# Patient Record
Sex: FEMALE | Race: WHITE | NOT HISPANIC OR LATINO | Employment: FULL TIME | URBAN - METROPOLITAN AREA
[De-identification: names, ages, dates, MRNs, and addresses within clinical notes are randomized per-mention and may not be internally consistent; named-entity substitution may affect disease eponyms.]

---

## 2017-12-06 ENCOUNTER — GENERIC CONVERSION - ENCOUNTER (OUTPATIENT)
Dept: OTHER | Facility: OTHER | Age: 37
End: 2017-12-06

## 2017-12-06 ENCOUNTER — ALLSCRIPTS OFFICE VISIT (OUTPATIENT)
Dept: OTHER | Facility: OTHER | Age: 37
End: 2017-12-06

## 2017-12-07 LAB
ABO GROUP BLD: NORMAL
BLD GP AB SCN SERPL QL: NEGATIVE
HCG, QUANTITATIVE (HISTORICAL): NORMAL MIU/ML
PROGESTERONE LEVEL (HISTORICAL): 24.8 NG/ML
RH BLD: POSITIVE

## 2017-12-08 ENCOUNTER — GENERIC CONVERSION - ENCOUNTER (OUTPATIENT)
Dept: OTHER | Facility: OTHER | Age: 37
End: 2017-12-08

## 2017-12-08 DIAGNOSIS — O36.80X0 PREGNANCY WITH INCONCLUSIVE FETAL VIABILITY: ICD-10-CM

## 2017-12-18 ENCOUNTER — HOSPITAL ENCOUNTER (OUTPATIENT)
Dept: ULTRASOUND IMAGING | Facility: MEDICAL CENTER | Age: 37
Discharge: HOME | End: 2017-12-18
Payer: COMMERCIAL

## 2017-12-18 DIAGNOSIS — O36.80X0 PREGNANCY WITH INCONCLUSIVE FETAL VIABILITY: ICD-10-CM

## 2017-12-18 PROCEDURE — 76801 OB US < 14 WKS SINGLE FETUS: CPT

## 2017-12-20 ENCOUNTER — GENERIC CONVERSION - ENCOUNTER (OUTPATIENT)
Dept: OTHER | Facility: OTHER | Age: 37
End: 2017-12-20

## 2018-01-05 ENCOUNTER — ALLSCRIPTS OFFICE VISIT (OUTPATIENT)
Dept: OTHER | Facility: OTHER | Age: 38
End: 2018-01-05

## 2018-01-05 ENCOUNTER — GENERIC CONVERSION - ENCOUNTER (OUTPATIENT)
Dept: OTHER | Facility: OTHER | Age: 38
End: 2018-01-05

## 2018-01-06 LAB
ABO GROUP BLD: ABNORMAL
BASOPHILS # BLD AUTO: 0 X10E3/UL (ref 0–0.2)
BASOPHILS # BLD AUTO: 1 %
BILIRUB UR QL STRIP: NEGATIVE
BLD GP AB SCN SERPL QL: NEGATIVE
COLOR UR: YELLOW
COMMENT (HISTORICAL): ABNORMAL
DEPRECATED RDW RBC AUTO: 13.1 % (ref 12.3–15.4)
EOSINOPHIL # BLD AUTO: 0.1 X10E3/UL (ref 0–0.4)
EOSINOPHIL # BLD AUTO: 2 %
FECAL OCCULT BLOOD DIAGNOSTIC (HISTORICAL): NEGATIVE
GLUCOSE (HISTORICAL): NEGATIVE
HCT VFR BLD AUTO: 33.5 % (ref 34–46.6)
HEPATITIS B SURFACE ANTIGEN (HISTORICAL): NEGATIVE
HGB BLD-MCNC: 11.8 G/DL (ref 11.1–15.9)
IMM.GRANULOCYTES (CD4/8) (HISTORICAL): 0.1 X10E3/UL (ref 0–0.1)
IMM.GRANULOCYTES (CD4/8) (HISTORICAL): 1 %
KETONES UR STRIP-MCNC: NEGATIVE MG/DL
LEUKOCYTE ESTERASE UR QL STRIP: NEGATIVE
LYMPHOCYTES # BLD AUTO: 1.5 X10E3/UL (ref 0.7–3.1)
LYMPHOCYTES # BLD AUTO: 26 %
MCH RBC QN AUTO: 31.1 PG (ref 26.6–33)
MCHC RBC AUTO-ENTMCNC: 35.2 G/DL (ref 31.5–35.7)
MCV RBC AUTO: 88 FL (ref 79–97)
MICROSCOPIC EXAMINATION (HISTORICAL): ABNORMAL
MONOCYTES # BLD AUTO: 0.8 X10E3/UL (ref 0.1–0.9)
MONOCYTES (HISTORICAL): 14 %
NEUTROPHILS # BLD AUTO: 3.2 X10E3/UL (ref 1.4–7)
NEUTROPHILS # BLD AUTO: 56 %
NITRITE UR QL STRIP: NEGATIVE
PH UR STRIP.AUTO: 5.5 [PH] (ref 5–7.5)
PLATELET # BLD AUTO: 273 X10E3/UL (ref 150–379)
PROT UR STRIP-MCNC: NEGATIVE MG/DL
RBC (HISTORICAL): 3.79 X10E6/UL (ref 3.77–5.28)
RH BLD: POSITIVE
RPR SCREEN (HISTORICAL): NON REACTIVE
RUBELLA, IGG (HISTORICAL): 3.16 INDEX
SP GR UR STRIP.AUTO: >=1.03 (ref 1–1.03)
UROBILINOGEN UR QL STRIP.AUTO: 0.2 EU/DL (ref 0.2–1)
WBC # BLD AUTO: 5.7 X10E3/UL (ref 3.4–10.8)

## 2018-01-07 LAB — CULTURE RESULT (HISTORICAL): ABNORMAL

## 2018-01-23 NOTE — RESULT NOTES
Verified Results  (1) HCG QUANT [id number] 04:14AM [employer]     Test Name Result Flag Reference   hCG,Beta Subunit,Qnt,Serum 35882 mIU/mL     Female (Non-pregnant)    0 -     5                                             (Postmenopausal)  0 -     8                                      Female (Pregnant)                                      Weeks of Gestation                                              3                6 -    71                                              4               10 -   750                                              5              702 - 7752                                              6              [id number]                                              0            [address] [facility] [id number]                                              0            [phone]                                             57            [address] [facility] 96                                             [phone]  Specimen was diluted in  order to obtain results  Results were repeated    Roche ECLIA methodology     (1) PROGESTERONE [id number] 04:14AM [employer]     Test Name Result Flag Reference   Progesterone 29 3 ng/mL     Follicular phase       0 1 -   0 9                                      Luteal phase           1 8 -  23 9                                      Ovulation phase        0 1 -  12 0                                      Pregnant                                         First trimester    11 0 -  44 3 Second trimester   25 4 -  83 3                                         Third trimester    58 7 - 214 0                                      Postmenopausal         0 0 -   0 1     (LC) Rh+ABO+Ab Scr [id number] 04:14AM [facility] [employer]     Test Name Result Flag Reference   ABO Grouping A     Rh Factor Positive     Please note: Prior records for this patient's ABO / Rh type are not  available for additional verification     Antibody Screen Negative  Negative

## 2018-01-23 NOTE — RESULT NOTES
Verified Results  US OB LESS THAN 14 WKS WITH TRANSVAGINAL [id number] 03:45PM Eva Costa Order Number: [id number]    - Patient Instructions: To schedule this appointment, please contact Central Scheduling at [phone]  Test Name Result Flag Reference   US OB LESS THAN 14 WEEKS WITH TRANSVAGINAL (Report)     FIRST TRIMESTER OBSTETRIC ULTRASOUND, COMPLETE     INDICATION: O36 80X0: Pregnancy with inconclusive fetal viability, not applicable or unspecified  History taken directly from the electronic ordering system  LMP is 10/20/2017     COMPARISON: None  TECHNIQUE:  Transabdominal ultrasound of the pelvis was performed  Additional transvaginal imaging was then performed to better assess the gestation, myometrial/endometrial architecture and ovarian parenchymal detail  The study includes volumetric    sweeps and traditional still imaging technique  FINDINGS:     A single live intrauterine gestation is identified  Cardiac activity is present  Heart rate of 154 bpm      YOLK SAC: Present and normal in size and appearance  MEAN GESTATIONAL SAC SIZE: 28 mm = 7 weeks 4 days    MEAN CROWN RUMP LENGTH: 12 mm = 7 weeks 3 days    FETAL ANATOMY: Appropriate for gestational age  AMNIOTIC FLUID/SAC SHAPE: Within expected normal range  PLACENTA: The placenta is appropriate for gestational age  There is no significant subchorionic fluid collection  UTERUS/ADNEXA:    Uterus is retroverted  The uterus and ovaries are within normal limits  Left corpus luteal cyst    The cervix remains closed  No free fluid present  IMPRESSION:     Single live intrauterine gestation at 7 weeks 3 days (range +/- 5)       POPEYE of 8/3/2018      8-9       Workstation performed: [id number]     Signed by:   Ivy Campbell MD   12/19/17

## 2018-01-23 NOTE — RESULT NOTES
Verified Results  (1) URINALYSIS (will reflex a microscopy if leukocytes, occult blood, protein or nitrites are not within normal limits) [id number] 12:00AM [employer]     Test Name Result Flag Reference   Specific Gravity >=1 030 A 1 005-1 030   pH 5 5  5 0-7 5   Urine-Color Yellow  Yellow   Appearance Turbid A Clear   WBC Esterase Negative  Negative   Protein Negative  Negative/Trace   Glucose Negative  Negative   Ketones Negative  Negative   Occult Blood Negative  Negative   Bilirubin Negative  Negative   Urobilinogen,Semi-Qn 0 2 EU/dL  0 2-1 0   Nitrite, Urine Negative  Negative   Microscopic Examination      Microscopic follows if indicated  Microscopic follows if indicated  [facility]) Prenatal Profile I [id number] 12:00AM [employer]     Test Name Result Flag Reference   HBsAg Screen Negative  Negative   Rubella Antibodies, IgG 3 16 index  Immune >0 99   Non-immune       <0 90                                                 Equivocal  0 90 - 0 99                                                 Immune           >0 99   ABO Grouping A     Rh Factor Positive     Please note: Prior records for this patient's ABO / Rh type are not  available for additional verification  Antibody Screen Negative  Negative   RPR Non Reactive  Non Reactive   WBC 5 7 x10E3/uL  3 4-10 8   RBC 3 79 x10E6/uL  3 77-5 28   Hemoglobin 11 8 g/dL  11 1-15 9   Hematocrit 33 5 % L 34 0-46  6   MCV 88 fL  79-97   MCH 31 1 pg  26 6-33 0   MCHC 35 2 g/dL  31 5-35 7   RDW 13 1 %  12 3-15 4   Platelets 828 A68L2/AF  150-379   Neutrophils 56 %  Not Estab  Lymphs 26 %  Not Estab  Monocytes 14 %  Not Estab  Eos 2 %  Not Estab  Basos 1 %  Not Estab  Neutrophils (Absolute) 3 2 x10E3/uL  1 4-7 0   Lymphs (Absolute) 1 5 x10E3/uL  0 7-3 1   Monocytes(Absolute) 0 8 x10E3/uL  0 1-0 9   Eos (Absolute) 0 1 x10E3/uL  0 0-0 4   Baso (Absolute) 0 0 x10E3/uL  0 0-0 2   Immature Granulocytes 1 %  Not Estab     Immature Grans (Abs) 0 1 x10E3/uL  0 0-0 1     (1) URINE CULTURE [id number] 12:00AM Jesse Rowell     Test Name Result Flag Reference   Urine Culture,Comprehensive Final report A    Result 1 (Report) A    Beta hemolytic Streptococcus, group B  100 Colonies/mL                             Penicillin and ampicillin are drugs of choice for treatment of  beta-hemolytic streptococcal infections  Susceptibility testing of  penicillins and other beta-lactam agents approved by the FDA for  treatment of beta-hemolytic streptococcal infections need not be  performed routinely because nonsusceptible isolates are extremely  rare in any beta-hemolytic streptococcus and have not been reported  for Streptococcus pyogenes (group A)  (CLSI 2011)   Beta hemolytic Streptococcus, group B  100 Colonies/mL                                                        Penicillin and ampicillin are drugs of choice for treatment of  beta-hemolytic streptococcal infections  Susceptibility testing of  penicillins and other beta-lactam agents approved by the FDA for  treatment of beta-hemolytic streptococcal infections need not be  performed routinely because nonsusceptible isolates are extremely  rare in any beta-hemolytic streptococcus and have not been reported  for Streptococcus pyogenes (group A)  (CLSI 2011)       Plan  Encounter for supervision of normal pregnancy in first trimester    · (1) URINALYSIS (will reflex a microscopy if leukocytes, occult blood, protein or nitrites  are not within normal limits); Status:Complete;   Done: [id number] 12:00AM   · (1) URINE CULTURE; Source:Urine, Unspecified Source; Status:Active; Requested  ONX:[id number];    · (Q) OBSTETRIC PANEL W/FOURTH GENERATION HIV; Status:Active;  Requested  FJP:[id number];

## 2018-02-01 ENCOUNTER — OB ABSTRACT (OUTPATIENT)
Dept: OBGYN CLINIC | Facility: MEDICAL CENTER | Age: 38
End: 2018-02-01

## 2018-02-02 ENCOUNTER — INITIAL PRENATAL (OUTPATIENT)
Dept: OBGYN CLINIC | Facility: MEDICAL CENTER | Age: 38
End: 2018-02-02

## 2018-02-02 ENCOUNTER — APPOINTMENT (OUTPATIENT)
Dept: LAB | Facility: MEDICAL CENTER | Age: 38
End: 2018-02-02
Payer: COMMERCIAL

## 2018-02-02 ENCOUNTER — TRANSCRIBE ORDERS (OUTPATIENT)
Dept: ADMINISTRATIVE | Facility: HOSPITAL | Age: 38
End: 2018-02-02

## 2018-02-02 VITALS — DIASTOLIC BLOOD PRESSURE: 72 MMHG | WEIGHT: 155.9 LBS | SYSTOLIC BLOOD PRESSURE: 116 MMHG

## 2018-02-02 DIAGNOSIS — B95.1 GBS (GROUP B STREPTOCOCCUS) UTI COMPLICATING PREGNANCY, FIRST TRIMESTER: ICD-10-CM

## 2018-02-02 DIAGNOSIS — Z34.92 SECOND TRIMESTER PREGNANCY: Primary | ICD-10-CM

## 2018-02-02 DIAGNOSIS — Z34.92 SECOND TRIMESTER PREGNANCY: ICD-10-CM

## 2018-02-02 DIAGNOSIS — O23.41 GBS (GROUP B STREPTOCOCCUS) UTI COMPLICATING PREGNANCY, FIRST TRIMESTER: ICD-10-CM

## 2018-02-02 PROCEDURE — 87389 HIV-1 AG W/HIV-1&-2 AB AG IA: CPT

## 2018-02-02 PROCEDURE — 36415 COLL VENOUS BLD VENIPUNCTURE: CPT

## 2018-02-02 PROCEDURE — PNV: Performed by: OBSTETRICS & GYNECOLOGY

## 2018-02-02 RX ORDER — CALCIUM CITRATE, IRON PENTACARBONYL, CHOLECALCIFEROL, .ALPHA.-TOCOPHEROL, DL-, PYRIDOXINE HYDROCHLORIDE, FOLIC ACID, DOCUSATE SODIUM, AND DOCONEXENT 104; 27; 400; 30; 25; 1; 50; 260 MG/1; MG/1; [IU]/1; [IU]/1; MG/1; MG/1; MG/1; MG/1
1 CAPSULE, GELATIN COATED ORAL DAILY
Refills: 3 | COMMUNITY
Start: 2018-01-11 | End: 2018-12-03 | Stop reason: ALTCHOICE

## 2018-02-02 NOTE — PROGRESS NOTES
Franklin Guardado is a 30y o  year old  at 14w0d for first prenatal visit  Nausea No Vomiting No   Exam done today - see OB flowsheet  Pap and HPV  done today   Gonorrhea and Chlamydia sent  Labs reviewed  - HIV missing - order for lab given   Genetic testing declined  Order to schedule level 2 given  GBS in urine - will need tx in labor   Declined flu shot   Pt has been counseled re diet, exercise, weight gain, foods to avoid, vaccines in pregnancy, trisomy screening, travel precautions to include seat belt use and VTE risk reduction  She has been provided our pregnancy packet which includes how and when to contact providers, medication recommendations, dietary suggestions, breastfeeding information as well as websites for additional information, hospital and delivery concerns

## 2018-02-04 LAB
BACTERIA UR CULT: NORMAL
Lab: NO GROWTH

## 2018-02-05 LAB — HIV 1+2 AB+HIV1 P24 AG SERPL QL IA: NORMAL

## 2018-02-06 LAB
C TRACH RRNA CVX QL NAA+PROBE: NEGATIVE
CYTOLOGIST CVX/VAG CYTO: NORMAL
DX ICD CODE: NORMAL
HPV I/H RISK 1 DNA CVX QL PROBE+SIG AMP: NEGATIVE
N GONORRHOEA RRNA CVX QL NAA+PROBE: NEGATIVE
OTHER STN SPEC: NORMAL
PATH REPORT.FINAL DX SPEC: NORMAL
SL AMB NOTE:: NORMAL
SL AMB SPECIMEN ADEQUACY: NORMAL
SL AMB TEST METHODOLOGY: NORMAL

## 2018-02-15 ENCOUNTER — TELEPHONE (OUTPATIENT)
Dept: OBGYN CLINIC | Facility: MEDICAL CENTER | Age: 38
End: 2018-02-15

## 2018-03-05 ENCOUNTER — ROUTINE PRENATAL (OUTPATIENT)
Dept: OBGYN CLINIC | Facility: MEDICAL CENTER | Age: 38
End: 2018-03-05

## 2018-03-05 VITALS — SYSTOLIC BLOOD PRESSURE: 118 MMHG | DIASTOLIC BLOOD PRESSURE: 68 MMHG | WEIGHT: 166 LBS

## 2018-03-05 DIAGNOSIS — Z34.02 ENCOUNTER FOR SUPERVISION OF NORMAL FIRST PREGNANCY IN SECOND TRIMESTER: Primary | ICD-10-CM

## 2018-03-05 PROCEDURE — PNV: Performed by: OBSTETRICS & GYNECOLOGY

## 2018-03-05 NOTE — PROGRESS NOTES
Franklin Guardado is a 30y o  year old  at 18w3d for routine prenatal visit    + FM, no vaginal bleeding, contractions, or LOF  Complaints: No   Occasional sciatic pain - supportive measures reviewed  Most recent ultrasound and labs reviewed    Level 2 scheduled

## 2018-03-12 ENCOUNTER — ROUTINE PRENATAL (OUTPATIENT)
Dept: PERINATAL CARE | Facility: CLINIC | Age: 38
End: 2018-03-12
Payer: COMMERCIAL

## 2018-03-12 VITALS
SYSTOLIC BLOOD PRESSURE: 109 MMHG | BODY MASS INDEX: 24.34 KG/M2 | DIASTOLIC BLOOD PRESSURE: 72 MMHG | WEIGHT: 170 LBS | HEIGHT: 70 IN | HEART RATE: 77 BPM

## 2018-03-12 DIAGNOSIS — Z34.92 SECOND TRIMESTER PREGNANCY: ICD-10-CM

## 2018-03-12 DIAGNOSIS — Z3A.19 19 WEEKS GESTATION OF PREGNANCY: ICD-10-CM

## 2018-03-12 DIAGNOSIS — Z36.86 ENCOUNTER FOR ANTENATAL SCREENING FOR CERVICAL LENGTH: ICD-10-CM

## 2018-03-12 DIAGNOSIS — O09.512 ELDERLY PRIMIGRAVIDA IN SECOND TRIMESTER: Primary | ICD-10-CM

## 2018-03-12 PROCEDURE — 76811 OB US DETAILED SNGL FETUS: CPT | Performed by: OBSTETRICS & GYNECOLOGY

## 2018-03-12 PROCEDURE — 76817 TRANSVAGINAL US OBSTETRIC: CPT | Performed by: OBSTETRICS & GYNECOLOGY

## 2018-03-12 PROCEDURE — 99201 PR OFFICE OUTPATIENT NEW 10 MINUTES: CPT | Performed by: OBSTETRICS & GYNECOLOGY

## 2018-04-02 ENCOUNTER — ROUTINE PRENATAL (OUTPATIENT)
Dept: OBGYN CLINIC | Facility: MEDICAL CENTER | Age: 38
End: 2018-04-02

## 2018-04-02 VITALS — SYSTOLIC BLOOD PRESSURE: 118 MMHG | WEIGHT: 175.8 LBS | DIASTOLIC BLOOD PRESSURE: 60 MMHG | BODY MASS INDEX: 25.22 KG/M2

## 2018-04-02 DIAGNOSIS — O09.512 ELDERLY PRIMIGRAVIDA IN SECOND TRIMESTER: ICD-10-CM

## 2018-04-02 PROCEDURE — PNV: Performed by: NURSE PRACTITIONER

## 2018-04-02 NOTE — PROGRESS NOTES
Geeta Woodward is a 30y o  year old  at 25w1d for routine prenatal visit    + FM, no vaginal bleeding, contractions, or LOF  Complaints: No   Most recent ultrasound and labs reviewed  Having a boy! Has 32 wk mfm scan  Pt would like BTL if needs c/s   ptl precautions reviewed

## 2018-04-30 ENCOUNTER — ROUTINE PRENATAL (OUTPATIENT)
Dept: OBGYN CLINIC | Facility: MEDICAL CENTER | Age: 38
End: 2018-04-30

## 2018-04-30 VITALS — DIASTOLIC BLOOD PRESSURE: 68 MMHG | SYSTOLIC BLOOD PRESSURE: 118 MMHG | WEIGHT: 183.9 LBS | BODY MASS INDEX: 26.39 KG/M2

## 2018-04-30 DIAGNOSIS — O09.512 ELDERLY PRIMIGRAVIDA IN SECOND TRIMESTER: ICD-10-CM

## 2018-04-30 PROCEDURE — PNV: Performed by: NURSE PRACTITIONER

## 2018-04-30 NOTE — PROGRESS NOTES
Balaji Sheldon is a 30y o  year old  at 34w2d for routine prenatal visit    + FM, no vaginal bleeding, contractions, or LOF  Complaints: Yes , has some vomiting about 2 times a week  Able to tolerate foods/fluid now  Thinks might be related to heartburn- otc rx d/w her  Most recent ultrasound and labs reviewed  Gave disability papers for Eliane to complete  Needs glucose and cbc at next visit  ptl precautions reviewed

## 2018-05-15 ENCOUNTER — CLINICAL SUPPORT (OUTPATIENT)
Dept: OBGYN CLINIC | Facility: MEDICAL CENTER | Age: 38
End: 2018-05-15

## 2018-05-15 DIAGNOSIS — Z34.92 ENCOUNTER FOR PREGNANCY RELATED EXAMINATION IN SECOND TRIMESTER: Primary | ICD-10-CM

## 2018-05-15 PROCEDURE — 36415 COLL VENOUS BLD VENIPUNCTURE: CPT | Performed by: OBSTETRICS & GYNECOLOGY

## 2018-05-17 LAB
BASOPHILS # BLD AUTO: 0 X10E3/UL (ref 0–0.2)
BASOPHILS NFR BLD AUTO: 0 %
EOSINOPHIL # BLD AUTO: 0.1 X10E3/UL (ref 0–0.4)
EOSINOPHIL NFR BLD AUTO: 1 %
ERYTHROCYTE [DISTWIDTH] IN BLOOD BY AUTOMATED COUNT: 13 % (ref 12.3–15.4)
GLUCOSE 1H P 50 G GLC PO SERPL-MCNC: 146 MG/DL (ref 65–139)
HCT VFR BLD AUTO: 32.3 % (ref 34–46.6)
HGB BLD-MCNC: 11.1 G/DL (ref 11.1–15.9)
IMM GRANULOCYTES # BLD: 0 X10E3/UL (ref 0–0.1)
IMM GRANULOCYTES NFR BLD: 0 %
LYMPHOCYTES # BLD AUTO: 1 X10E3/UL (ref 0.7–3.1)
LYMPHOCYTES NFR BLD AUTO: 13 %
MCH RBC QN AUTO: 31.9 PG (ref 26.6–33)
MCHC RBC AUTO-ENTMCNC: 34.4 G/DL (ref 31.5–35.7)
MCV RBC AUTO: 93 FL (ref 79–97)
MONOCYTES # BLD AUTO: 0.7 X10E3/UL (ref 0.1–0.9)
MONOCYTES NFR BLD AUTO: 9 %
NEUTROPHILS # BLD AUTO: 6.1 X10E3/UL (ref 1.4–7)
NEUTROPHILS NFR BLD AUTO: 77 %
PLATELET # BLD AUTO: 244 X10E3/UL (ref 150–379)
RBC # BLD AUTO: 3.48 X10E6/UL (ref 3.77–5.28)
WBC # BLD AUTO: 8 X10E3/UL (ref 3.4–10.8)

## 2018-05-21 DIAGNOSIS — O99.810 ABNORMAL GLUCOSE IN PREGNANCY, ANTEPARTUM: Primary | ICD-10-CM

## 2018-05-25 LAB
GLUCOSE 1H P 100 G GLC PO SERPL-MCNC: 127 MG/DL (ref 65–179)
GLUCOSE 2H P 100 G GLC PO SERPL-MCNC: 141 MG/DL (ref 65–154)
GLUCOSE 3H P 100 G GLC PO SERPL-MCNC: 87 MG/DL (ref 65–139)
GLUCOSE P FAST SERPL-MCNC: 75 MG/DL (ref 65–94)
SL AMB NOTE:: (no result)

## 2018-05-29 ENCOUNTER — ROUTINE PRENATAL (OUTPATIENT)
Dept: OBGYN CLINIC | Facility: MEDICAL CENTER | Age: 38
End: 2018-05-29

## 2018-05-29 VITALS — WEIGHT: 182.6 LBS | DIASTOLIC BLOOD PRESSURE: 64 MMHG | SYSTOLIC BLOOD PRESSURE: 108 MMHG | BODY MASS INDEX: 26.2 KG/M2

## 2018-05-29 DIAGNOSIS — O09.513 ELDERLY PRIMIGRAVIDA IN THIRD TRIMESTER: ICD-10-CM

## 2018-05-29 PROCEDURE — PNV: Performed by: NURSE PRACTITIONER

## 2018-05-29 NOTE — PROGRESS NOTES
Doris Allen is a 41y o  year old  at 30w3d for routine prenatal visit    + FM, no vaginal bleeding, contractions, or LOF  Complaints: Yes , still vomits 2 to 3xs a week  Rec she try B6 for sxs  Doesn't like to take any meds  Most recent ultrasound and labs reviewed  Glucose 3hr was normal   Gave birth plan and prebirth visit info

## 2018-06-07 ENCOUNTER — ULTRASOUND (OUTPATIENT)
Dept: PERINATAL CARE | Facility: CLINIC | Age: 38
End: 2018-06-07
Payer: COMMERCIAL

## 2018-06-07 VITALS
HEART RATE: 85 BPM | DIASTOLIC BLOOD PRESSURE: 59 MMHG | SYSTOLIC BLOOD PRESSURE: 94 MMHG | HEIGHT: 70 IN | WEIGHT: 186.4 LBS | BODY MASS INDEX: 26.69 KG/M2

## 2018-06-07 DIAGNOSIS — Z3A.31 31 WEEKS GESTATION OF PREGNANCY: ICD-10-CM

## 2018-06-07 DIAGNOSIS — Z36.89 ENCOUNTER FOR ULTRASOUND TO CHECK FETAL GROWTH: Primary | ICD-10-CM

## 2018-06-07 DIAGNOSIS — O09.513 ELDERLY PRIMIGRAVIDA IN THIRD TRIMESTER: ICD-10-CM

## 2018-06-07 PROCEDURE — 76816 OB US FOLLOW-UP PER FETUS: CPT | Performed by: OBSTETRICS & GYNECOLOGY

## 2018-06-08 NOTE — PROGRESS NOTES
YVETTE Gaston 55: Ms Eliceo Pettit was seen yesterday at 31w6d for fetal growth assessment ultrasound  See ultrasound report under "OB Procedures" tab  Please don't hesitate to contact our office with any concerns or questions    Kassandra Hall MD

## 2018-06-11 ENCOUNTER — ROUTINE PRENATAL (OUTPATIENT)
Dept: OBGYN CLINIC | Facility: MEDICAL CENTER | Age: 38
End: 2018-06-11

## 2018-06-11 VITALS — BODY MASS INDEX: 26.92 KG/M2 | SYSTOLIC BLOOD PRESSURE: 98 MMHG | WEIGHT: 187.6 LBS | DIASTOLIC BLOOD PRESSURE: 60 MMHG

## 2018-06-11 DIAGNOSIS — Z3A.32 32 WEEKS GESTATION OF PREGNANCY: Primary | ICD-10-CM

## 2018-06-11 PROCEDURE — PNV: Performed by: NURSE PRACTITIONER

## 2018-06-11 NOTE — PROGRESS NOTES
Cristiane Jara is a 41y o  year old  at [address] [facility] for routine prenatal visit    + FM, no vaginal bleeding, contractions, or LOF  Complaints: No   Most recent ultrasound and labs reviewed  States recent scan showing baby in 50th percentile  Returned birth plan  Has pbv scheduled for   bp low at 98/60 today, pt asymtomatic advised increase water and salt in diet  D/c hot showers, change positions slowly  fkc, ptl precautions reviewed

## 2018-06-25 ENCOUNTER — ROUTINE PRENATAL (OUTPATIENT)
Dept: OBGYN CLINIC | Facility: MEDICAL CENTER | Age: 38
End: 2018-06-25
Payer: COMMERCIAL

## 2018-06-25 VITALS — WEIGHT: 191 LBS | BODY MASS INDEX: 27.41 KG/M2 | DIASTOLIC BLOOD PRESSURE: 70 MMHG | SYSTOLIC BLOOD PRESSURE: 112 MMHG

## 2018-06-25 DIAGNOSIS — O09.513 ELDERLY PRIMIGRAVIDA IN THIRD TRIMESTER: Primary | ICD-10-CM

## 2018-06-25 DIAGNOSIS — O26.86 PUPP (PRURITIC URTICARIAL PAPULES AND PLAQUES OF PREGNANCY): ICD-10-CM

## 2018-06-25 DIAGNOSIS — Z23 NEED FOR TDAP VACCINATION: ICD-10-CM

## 2018-06-25 DIAGNOSIS — Z3A.34 34 WEEKS GESTATION OF PREGNANCY: ICD-10-CM

## 2018-06-25 PROCEDURE — 90471 IMMUNIZATION ADMIN: CPT

## 2018-06-25 PROCEDURE — 90715 TDAP VACCINE 7 YRS/> IM: CPT

## 2018-06-25 PROCEDURE — PNV: Performed by: OBSTETRICS & GYNECOLOGY

## 2018-06-25 NOTE — PATIENT INSTRUCTIONS
POLYMORPHIC ERUPTION OF PREGNANCY  Or PUPPPS  What is polymorphic eruption of pregnancy? Polymorphic eruption of pregnancy is a relatively common skin disorder that  can occur in women during pregnancy  It usually presents within a womens  first pregnancy  It is characterised by an itchy rash that commonly begins on  the abdomen, particularly within stretch marks (striae)  It most usually  develops during late pregnancy (third trimester) but can also present  immediately after the baby is born  It was previously known as PUPPP (pruritic and urticarial papules and  plaques of pregnancy)  What causes PEP? The cause of PEP is unknown  It is thought to occur due to the stretching of  the skin on the abdomen and due to the hormonal changes within pregnancy  It occurs more commonly with multiple pregnancy (twins or triplets)  Previous  studies have suggested a link with increased maternal weight gain during  pregnancy, increased birthweight and sex hormones, but these are not  proven  Does PEP run in families? No   Page 2 of 4  [place] [employer]  www bad org [place]/leaflets  Registered [place] No  [id number]  What are the symptoms of PEP and what does it look like? Itching is common and often starts on the abdomen often sparing the  umbilicus (belly button) during late pregnancy (3rd trimester)  If stretch marks  (striae) are present, the itching may start within them  Itching may then be  followed by a rash with wheals (like hives from bradley), small raised lumps in  the skin (papules) and large red inflamed areas of skin (plaques)  It commonly  spreads on the trunk, lower abdomen, under the breasts and limbs  The face,  scalp and mucous membranes (mouth and genital area) are hardly ever  affected  Small blisters are occasionally present and if these are scratched  then straw-coloured fluid may leak out and cause crusts to form    It is important to seek advice from your dermatologist if you develop  numerous blisters as PEP can resemble an early form of another skin  condition in pregnancy called Pemphigoid gestastionis  This condition may  require different treatment and monitoring for you and the baby  How will PEP be diagnosed? Diagnosis is usually made by a dermatologist or another doctor based on the  typical appearance and distribution of the rash  However, if the appearance is  not typical your dermatologist may take a skin biopsy (sample of skin under  local anaesthetic) and a blood test to help make the diagnosis and rule out  other causes of the rash  Can PEP be cured? In most cases this condition is self-limiting and will get better towards the end  of pregnancy or immediately following delivery  It can be suppressed with  treatment  In most cases symptoms resolve within a few weeks after giving  birth  How can PEP be treated? The primary aim of treatment is to relieve itching and to reduce inflammation  and redness in the skin  Soothing agents can help to relieve itching and soreness  These include cool  baths, wet soaks and wearing cotton clothes  Bath emollients and soap  substitutes followed by emollient creams or ointments will help to moisturise  the skin  Menthol in aqueous cream can be particularly helpful  Page 3 of 4  [place] [employer]  www bad org [place]/leaflets  Registered [place] No  [id number]  CAUTION: This leaflet mentions emollients (moisturisers)  When paraffincontaining  emollient products get in contact with dressings, clothing, bed linen  or hair, there is a danger that a naked flame or cigarette smoking could cause  these to catch fire  To reduce the fire risk, patients using paraffin-containing  skincare or haircare products are advised to avoid naked flames completely,  including smoking cigarettes and being near people who are smoking or using  naked flames   It is also advisable to wash clothing and bed linen regularly,  preferably daily  Topical steroid creams or ointments are often prescribed to reduce the  inflammation in the skin and are safe to use during pregnancy  Oral antihistamines (only those suitable for use during pregnancy, such as  Loratadine, benadryl) can be used to relieve itching  Rarely, if the condition is very severe, a short course of steroids by mouth  may be prescribed  Will the baby be affected? No  There have been no reports of the baby being affected  Is normal delivery possible? Yes  Caesarean section is not required  Can women with PEP still breastfeed? Yes  Breastfeeding does not appear to affect PEP  It is safe to breast feed  your baby even if you are taking steroid tablets as only a tiny amount of  steroid gets into breast milk  Is any special monitoring required? No, but regular attendance at the  clinic is important  It is  recommended that your midwife and/or obstetrician is informed of this  diagnosis  Will PEP reoccur? The condition tends not to reoccur, except in multiple pregnancies

## 2018-06-25 NOTE — PROGRESS NOTES
Balaji Sheldon is a 41y o  year old  at 34w3d for routine prenatal visit    + FM, no vaginal bleeding, contractions, or LOF  Complaints: Yes rash, itching - consistent with PUPPS  Rx topical corticosteroid sent, recommend antihistamines  Most recent ultrasound and labs reviewed  GBS bacteruria - no culture at next visit  tdap given today  In event of , patient would like tubal ligation, consent signed today

## 2018-07-02 ENCOUNTER — ROUTINE PRENATAL (OUTPATIENT)
Dept: OBGYN CLINIC | Facility: MEDICAL CENTER | Age: 38
End: 2018-07-02

## 2018-07-02 VITALS — BODY MASS INDEX: 27.41 KG/M2 | SYSTOLIC BLOOD PRESSURE: 112 MMHG | DIASTOLIC BLOOD PRESSURE: 60 MMHG | WEIGHT: 191 LBS

## 2018-07-02 DIAGNOSIS — Z3A.35 35 WEEKS GESTATION OF PREGNANCY: ICD-10-CM

## 2018-07-02 DIAGNOSIS — O09.513 ELDERLY PRIMIGRAVIDA IN THIRD TRIMESTER: Primary | ICD-10-CM

## 2018-07-02 PROCEDURE — PNV: Performed by: OBSTETRICS & GYNECOLOGY

## 2018-07-02 NOTE — PROGRESS NOTES
Rajesh Antonio is a 41y o  year old  at 30w2d for routine prenatal visit    + FM, no vaginal bleeding, contractions, or LOF  Complaints: No   Most recent ultrasound and labs reviewed    Has cold, recommendations reviewed

## 2018-07-12 ENCOUNTER — ROUTINE PRENATAL (OUTPATIENT)
Dept: OBGYN CLINIC | Facility: MEDICAL CENTER | Age: 38
End: 2018-07-12

## 2018-07-12 VITALS — WEIGHT: 196 LBS | DIASTOLIC BLOOD PRESSURE: 62 MMHG | BODY MASS INDEX: 28.12 KG/M2 | SYSTOLIC BLOOD PRESSURE: 108 MMHG

## 2018-07-12 DIAGNOSIS — O09.513 ELDERLY PRIMIGRAVIDA IN THIRD TRIMESTER: Primary | ICD-10-CM

## 2018-07-12 DIAGNOSIS — Z3A.36 36 WEEKS GESTATION OF PREGNANCY: ICD-10-CM

## 2018-07-12 PROCEDURE — PNV: Performed by: OBSTETRICS & GYNECOLOGY

## 2018-07-12 NOTE — PROGRESS NOTES
Miriam Yoder is a 41y o  year old  at 36w7d for routine prenatal visit    + FM, no vaginal bleeding, contractions, or LOF  Complaints: No   Most recent ultrasound and labs reviewed    GBS positive  Labor precautions

## 2018-07-16 ENCOUNTER — ROUTINE PRENATAL (OUTPATIENT)
Dept: OBGYN CLINIC | Facility: MEDICAL CENTER | Age: 38
End: 2018-07-16

## 2018-07-16 VITALS — SYSTOLIC BLOOD PRESSURE: 110 MMHG | BODY MASS INDEX: 28.55 KG/M2 | DIASTOLIC BLOOD PRESSURE: 72 MMHG | WEIGHT: 199 LBS

## 2018-07-16 DIAGNOSIS — Z3A.37 37 WEEKS GESTATION OF PREGNANCY: ICD-10-CM

## 2018-07-16 DIAGNOSIS — O09.513 ELDERLY PRIMIGRAVIDA IN THIRD TRIMESTER: Primary | ICD-10-CM

## 2018-07-16 PROCEDURE — PNV: Performed by: OBSTETRICS & GYNECOLOGY

## 2018-07-16 NOTE — PROGRESS NOTES
Natalia Libman is a 41y o  year old  at 37w3d for routine prenatal visit    + FM, no vaginal bleeding, contractions, or LOF  Complaints: No, still has occasional vomiting  Most recent ultrasound and labs reviewed    Labor precautions

## 2018-07-18 ENCOUNTER — HOSPITAL ENCOUNTER (INPATIENT)
Facility: HOSPITAL | Age: 38
LOS: 2 days | Discharge: HOME | End: 2018-07-20
Attending: OBSTETRICS & GYNECOLOGY | Admitting: OBSTETRICS & GYNECOLOGY
Payer: COMMERCIAL

## 2018-07-18 ENCOUNTER — ANESTHESIA EVENT (INPATIENT)
Dept: LABOR AND DELIVERY | Facility: HOSPITAL | Age: 38
End: 2018-07-18
Payer: COMMERCIAL

## 2018-07-18 ENCOUNTER — ANESTHESIA (INPATIENT)
Dept: LABOR AND DELIVERY | Facility: HOSPITAL | Age: 38
End: 2018-07-18
Payer: COMMERCIAL

## 2018-07-18 DIAGNOSIS — Z98.891 STATUS POST PRIMARY LOW TRANSVERSE CESAREAN SECTION: Primary | ICD-10-CM

## 2018-07-18 DIAGNOSIS — Z3A.37 37 WEEKS GESTATION OF PREGNANCY: ICD-10-CM

## 2018-07-18 LAB
ABO GROUP BLD: NORMAL
BASE EXCESS BLDCOA CALC-SCNC: 24.5 MMOL/L (ref 3–11)
BASE EXCESS BLDCOV CALC-SCNC: -4.8 MMOL/L (ref 1–9)
BASOPHILS # BLD AUTO: 0.01 THOUSANDS/ΜL (ref 0–0.1)
BASOPHILS NFR BLD AUTO: 0 % (ref 0–1)
BLD GP AB SCN SERPL QL: NEGATIVE
EOSINOPHIL # BLD AUTO: 0.04 THOUSAND/ΜL (ref 0–0.61)
EOSINOPHIL NFR BLD AUTO: 0 % (ref 0–6)
ERYTHROCYTE [DISTWIDTH] IN BLOOD BY AUTOMATED COUNT: 13.3 % (ref 11.6–15.1)
EXTERNAL GROUP B STREP ANTIGEN: POSITIVE
HCO3 BLDCOA-SCNC: <0 MMOL/L (ref 17.3–27.3)
HCO3 BLDCOV-SCNC: 24.4 MMOL/L (ref 12.2–28.6)
HCT VFR BLD AUTO: 33.7 % (ref 34.8–46.1)
HGB BLD-MCNC: 11.4 G/DL (ref 11.5–15.4)
LYMPHOCYTES # BLD AUTO: 1.08 THOUSANDS/ΜL (ref 0.6–4.47)
LYMPHOCYTES NFR BLD AUTO: 8 % (ref 14–44)
MCH RBC QN AUTO: 31.3 PG (ref 26.8–34.3)
MCHC RBC AUTO-ENTMCNC: 33.8 G/DL (ref 31.4–37.4)
MCV RBC AUTO: 93 FL (ref 82–98)
MONOCYTES # BLD AUTO: 1.01 THOUSAND/ΜL (ref 0.17–1.22)
MONOCYTES NFR BLD AUTO: 8 % (ref 4–12)
NEUTROPHILS # BLD AUTO: 10.92 THOUSANDS/ΜL (ref 1.85–7.62)
NEUTS SEG NFR BLD AUTO: 84 % (ref 43–75)
NRBC BLD AUTO-RTO: 0 /100 WBCS
O2 CT VFR BLDCOA CALC: 27 ML/DL
OXYHGB MFR BLDCOA: 0 %
OXYHGB MFR BLDCOV: 12.2 %
PCO2 BLDCOA: 68 MM[HG] (ref 30–60)
PCO2 BLDCOV: 61.8 MM HG (ref 27–43)
PH BLDCOA: 7.16 [PH] (ref 7.23–7.43)
PH BLDCOV: 7.21 [PH] (ref 7.19–7.49)
PLATELET # BLD AUTO: 231 THOUSANDS/UL (ref 149–390)
PMV BLD AUTO: 10 FL (ref 8.9–12.7)
PO2 BLDCOA: <13 MM HG (ref 5–25)
PO2 BLDCOV: 10.3 MM HG (ref 15–45)
RBC # BLD AUTO: 3.64 MILLION/UL (ref 3.81–5.12)
RH BLD: POSITIVE
SAO2 % BLDCOV: 2.9 ML/DL
SPECIMEN EXPIRATION DATE: NORMAL
WBC # BLD AUTO: 13.06 THOUSAND/UL (ref 4.31–10.16)

## 2018-07-18 PROCEDURE — 99212 OFFICE O/P EST SF 10 MIN: CPT

## 2018-07-18 PROCEDURE — 59510 CESAREAN DELIVERY: CPT | Performed by: OBSTETRICS & GYNECOLOGY

## 2018-07-18 PROCEDURE — 85025 COMPLETE CBC W/AUTO DIFF WBC: CPT | Performed by: OBSTETRICS & GYNECOLOGY

## 2018-07-18 PROCEDURE — 4A1HXCZ MONITORING OF PRODUCTS OF CONCEPTION, CARDIAC RATE, EXTERNAL APPROACH: ICD-10-PCS | Performed by: OBSTETRICS & GYNECOLOGY

## 2018-07-18 PROCEDURE — 10907ZC DRAINAGE OF AMNIOTIC FLUID, THERAPEUTIC FROM PRODUCTS OF CONCEPTION, VIA NATURAL OR ARTIFICIAL OPENING: ICD-10-PCS | Performed by: OBSTETRICS & GYNECOLOGY

## 2018-07-18 PROCEDURE — 94762 N-INVAS EAR/PLS OXIMTRY CONT: CPT

## 2018-07-18 PROCEDURE — 58611 LIGATE OVIDUCT(S) ADD-ON: CPT | Performed by: OBSTETRICS & GYNECOLOGY

## 2018-07-18 PROCEDURE — 86900 BLOOD TYPING SEROLOGIC ABO: CPT | Performed by: OBSTETRICS & GYNECOLOGY

## 2018-07-18 PROCEDURE — 88302 TISSUE EXAM BY PATHOLOGIST: CPT | Performed by: PATHOLOGY

## 2018-07-18 PROCEDURE — 0UB70ZZ EXCISION OF BILATERAL FALLOPIAN TUBES, OPEN APPROACH: ICD-10-PCS | Performed by: OBSTETRICS & GYNECOLOGY

## 2018-07-18 PROCEDURE — 86592 SYPHILIS TEST NON-TREP QUAL: CPT | Performed by: OBSTETRICS & GYNECOLOGY

## 2018-07-18 PROCEDURE — 86901 BLOOD TYPING SEROLOGIC RH(D): CPT | Performed by: OBSTETRICS & GYNECOLOGY

## 2018-07-18 PROCEDURE — 82805 BLOOD GASES W/O2 SATURATION: CPT | Performed by: OBSTETRICS & GYNECOLOGY

## 2018-07-18 PROCEDURE — 86850 RBC ANTIBODY SCREEN: CPT | Performed by: OBSTETRICS & GYNECOLOGY

## 2018-07-18 RX ORDER — ROPIVACAINE HYDROCHLORIDE 2 MG/ML
INJECTION, SOLUTION EPIDURAL; INFILTRATION; PERINEURAL
Status: COMPLETED
Start: 2018-07-18 | End: 2018-07-18

## 2018-07-18 RX ORDER — NALOXONE HYDROCHLORIDE 0.4 MG/ML
0.1 INJECTION, SOLUTION INTRAMUSCULAR; INTRAVENOUS; SUBCUTANEOUS
Status: DISCONTINUED | OUTPATIENT
Start: 2018-07-18 | End: 2018-07-18

## 2018-07-18 RX ORDER — MAGNESIUM HYDROXIDE/ALUMINUM HYDROXICE/SIMETHICONE 120; 1200; 1200 MG/30ML; MG/30ML; MG/30ML
15 SUSPENSION ORAL EVERY 6 HOURS PRN
Status: DISCONTINUED | OUTPATIENT
Start: 2018-07-18 | End: 2018-07-20 | Stop reason: HOSPADM

## 2018-07-18 RX ORDER — SIMETHICONE 80 MG
80 TABLET,CHEWABLE ORAL 4 TIMES DAILY PRN
Status: DISCONTINUED | OUTPATIENT
Start: 2018-07-18 | End: 2018-07-20 | Stop reason: HOSPADM

## 2018-07-18 RX ORDER — OXYCODONE HYDROCHLORIDE AND ACETAMINOPHEN 5; 325 MG/1; MG/1
2 TABLET ORAL EVERY 4 HOURS PRN
Status: DISCONTINUED | OUTPATIENT
Start: 2018-07-19 | End: 2018-07-20 | Stop reason: HOSPADM

## 2018-07-18 RX ORDER — KETOROLAC TROMETHAMINE 30 MG/ML
30 INJECTION, SOLUTION INTRAMUSCULAR; INTRAVENOUS EVERY 6 HOURS PRN
Status: DISCONTINUED | OUTPATIENT
Start: 2018-07-18 | End: 2018-07-18

## 2018-07-18 RX ORDER — NITROGLYCERIN 5 MG/ML
INJECTION, SOLUTION INTRAVENOUS AS NEEDED
Status: DISCONTINUED | OUTPATIENT
Start: 2018-07-18 | End: 2018-07-18 | Stop reason: SURG

## 2018-07-18 RX ORDER — LIDOCAINE HYDROCHLORIDE AND EPINEPHRINE 20; 5 MG/ML; UG/ML
INJECTION, SOLUTION EPIDURAL; INFILTRATION; INTRACAUDAL; PERINEURAL AS NEEDED
Status: DISCONTINUED | OUTPATIENT
Start: 2018-07-18 | End: 2018-07-18 | Stop reason: SURG

## 2018-07-18 RX ORDER — DOCUSATE SODIUM 100 MG/1
100 CAPSULE, LIQUID FILLED ORAL 2 TIMES DAILY
Status: DISCONTINUED | OUTPATIENT
Start: 2018-07-18 | End: 2018-07-20 | Stop reason: HOSPADM

## 2018-07-18 RX ORDER — ONDANSETRON 2 MG/ML
4 INJECTION INTRAMUSCULAR; INTRAVENOUS EVERY 6 HOURS PRN
Status: DISCONTINUED | OUTPATIENT
Start: 2018-07-18 | End: 2018-07-18

## 2018-07-18 RX ORDER — IBUPROFEN 600 MG/1
600 TABLET ORAL EVERY 6 HOURS PRN
Status: DISCONTINUED | OUTPATIENT
Start: 2018-07-18 | End: 2018-07-20 | Stop reason: HOSPADM

## 2018-07-18 RX ORDER — OXYTOCIN/RINGER'S LACTATE 30/500 ML
PLASTIC BAG, INJECTION (ML) INTRAVENOUS CONTINUOUS PRN
Status: DISCONTINUED | OUTPATIENT
Start: 2018-07-18 | End: 2018-07-18 | Stop reason: SURG

## 2018-07-18 RX ORDER — OXYTOCIN/RINGER'S LACTATE 30/500 ML
PLASTIC BAG, INJECTION (ML) INTRAVENOUS
Status: COMPLETED
Start: 2018-07-18 | End: 2018-07-18

## 2018-07-18 RX ORDER — MORPHINE SULFATE 0.5 MG/ML
INJECTION, SOLUTION EPIDURAL; INTRATHECAL; INTRAVENOUS
Status: COMPLETED
Start: 2018-07-18 | End: 2018-07-18

## 2018-07-18 RX ORDER — ONDANSETRON 2 MG/ML
4 INJECTION INTRAMUSCULAR; INTRAVENOUS ONCE AS NEEDED
Status: DISCONTINUED | OUTPATIENT
Start: 2018-07-18 | End: 2018-07-18

## 2018-07-18 RX ORDER — OXYCODONE HYDROCHLORIDE AND ACETAMINOPHEN 5; 325 MG/1; MG/1
1 TABLET ORAL EVERY 4 HOURS PRN
Status: DISCONTINUED | OUTPATIENT
Start: 2018-07-18 | End: 2018-07-18

## 2018-07-18 RX ORDER — ONDANSETRON 2 MG/ML
4 INJECTION INTRAMUSCULAR; INTRAVENOUS EVERY 4 HOURS PRN
Status: DISCONTINUED | OUTPATIENT
Start: 2018-07-18 | End: 2018-07-18

## 2018-07-18 RX ORDER — KETOROLAC TROMETHAMINE 30 MG/ML
30 INJECTION, SOLUTION INTRAMUSCULAR; INTRAVENOUS EVERY 6 HOURS PRN
Status: DISCONTINUED | OUTPATIENT
Start: 2018-07-18 | End: 2018-07-20 | Stop reason: HOSPADM

## 2018-07-18 RX ORDER — MORPHINE SULFATE 0.5 MG/ML
INJECTION, SOLUTION EPIDURAL; INTRATHECAL; INTRAVENOUS AS NEEDED
Status: DISCONTINUED | OUTPATIENT
Start: 2018-07-18 | End: 2018-07-18 | Stop reason: SURG

## 2018-07-18 RX ORDER — DIPHENHYDRAMINE HYDROCHLORIDE 50 MG/ML
25 INJECTION INTRAMUSCULAR; INTRAVENOUS EVERY 6 HOURS PRN
Status: DISCONTINUED | OUTPATIENT
Start: 2018-07-18 | End: 2018-07-18

## 2018-07-18 RX ORDER — ACETAMINOPHEN 325 MG/1
650 TABLET ORAL EVERY 6 HOURS PRN
Status: DISCONTINUED | OUTPATIENT
Start: 2018-07-18 | End: 2018-07-20 | Stop reason: HOSPADM

## 2018-07-18 RX ORDER — METOCLOPRAMIDE HYDROCHLORIDE 5 MG/ML
5 INJECTION INTRAMUSCULAR; INTRAVENOUS EVERY 6 HOURS PRN
Status: DISCONTINUED | OUTPATIENT
Start: 2018-07-18 | End: 2018-07-18

## 2018-07-18 RX ORDER — DIAPER,BRIEF,INFANT-TODD,DISP
1 EACH MISCELLANEOUS DAILY PRN
Status: DISCONTINUED | OUTPATIENT
Start: 2018-07-18 | End: 2018-07-20 | Stop reason: HOSPADM

## 2018-07-18 RX ORDER — DIPHENHYDRAMINE HYDROCHLORIDE 50 MG/ML
25 INJECTION INTRAMUSCULAR; INTRAVENOUS EVERY 6 HOURS PRN
Status: DISCONTINUED | OUTPATIENT
Start: 2018-07-18 | End: 2018-07-20 | Stop reason: HOSPADM

## 2018-07-18 RX ORDER — FENTANYL CITRATE/PF 50 MCG/ML
50 SYRINGE (ML) INJECTION
Status: DISCONTINUED | OUTPATIENT
Start: 2018-07-18 | End: 2018-07-18

## 2018-07-18 RX ORDER — SODIUM CHLORIDE, SODIUM LACTATE, POTASSIUM CHLORIDE, CALCIUM CHLORIDE 600; 310; 30; 20 MG/100ML; MG/100ML; MG/100ML; MG/100ML
125 INJECTION, SOLUTION INTRAVENOUS CONTINUOUS
Status: DISCONTINUED | OUTPATIENT
Start: 2018-07-18 | End: 2018-07-18

## 2018-07-18 RX ORDER — NALBUPHINE HCL 10 MG/ML
5 AMPUL (ML) INJECTION
Status: DISCONTINUED | OUTPATIENT
Start: 2018-07-18 | End: 2018-07-18

## 2018-07-18 RX ORDER — DEXAMETHASONE SODIUM PHOSPHATE 10 MG/ML
8 INJECTION, SOLUTION INTRAMUSCULAR; INTRAVENOUS ONCE AS NEEDED
Status: DISCONTINUED | OUTPATIENT
Start: 2018-07-18 | End: 2018-07-18

## 2018-07-18 RX ORDER — CALCIUM CARBONATE 200(500)MG
1000 TABLET,CHEWABLE ORAL DAILY PRN
Status: DISCONTINUED | OUTPATIENT
Start: 2018-07-18 | End: 2018-07-20 | Stop reason: HOSPADM

## 2018-07-18 RX ORDER — ROPIVACAINE HYDROCHLORIDE 2 MG/ML
INJECTION, SOLUTION EPIDURAL; INFILTRATION; PERINEURAL AS NEEDED
Status: DISCONTINUED | OUTPATIENT
Start: 2018-07-18 | End: 2018-07-18 | Stop reason: SURG

## 2018-07-18 RX ORDER — ONDANSETRON 2 MG/ML
4 INJECTION INTRAMUSCULAR; INTRAVENOUS EVERY 8 HOURS PRN
Status: DISCONTINUED | OUTPATIENT
Start: 2018-07-18 | End: 2018-07-20 | Stop reason: HOSPADM

## 2018-07-18 RX ORDER — OXYCODONE HYDROCHLORIDE AND ACETAMINOPHEN 5; 325 MG/1; MG/1
1 TABLET ORAL EVERY 4 HOURS PRN
Status: DISCONTINUED | OUTPATIENT
Start: 2018-07-19 | End: 2018-07-20 | Stop reason: HOSPADM

## 2018-07-18 RX ORDER — OXYTOCIN/RINGER'S LACTATE 30/500 ML
62.5 PLASTIC BAG, INJECTION (ML) INTRAVENOUS
Status: DISPENSED | OUTPATIENT
Start: 2018-07-18 | End: 2018-07-19

## 2018-07-18 RX ADMIN — CEFAZOLIN SODIUM 2000 MG: 2 SOLUTION INTRAVENOUS at 13:30

## 2018-07-18 RX ADMIN — SODIUM CHLORIDE, SODIUM LACTATE, POTASSIUM CHLORIDE, AND CALCIUM CHLORIDE 999 ML/HR: .6; .31; .03; .02 INJECTION, SOLUTION INTRAVENOUS at 06:55

## 2018-07-18 RX ADMIN — SODIUM CHLORIDE, SODIUM LACTATE, POTASSIUM CHLORIDE, AND CALCIUM CHLORIDE 125 ML/HR: .6; .31; .03; .02 INJECTION, SOLUTION INTRAVENOUS at 11:15

## 2018-07-18 RX ADMIN — SODIUM CHLORIDE, SODIUM LACTATE, POTASSIUM CHLORIDE, AND CALCIUM CHLORIDE: .6; .31; .03; .02 INJECTION, SOLUTION INTRAVENOUS at 14:04

## 2018-07-18 RX ADMIN — SODIUM CHLORIDE, SODIUM LACTATE, POTASSIUM CHLORIDE, AND CALCIUM CHLORIDE: .6; .31; .03; .02 INJECTION, SOLUTION INTRAVENOUS at 15:12

## 2018-07-18 RX ADMIN — LIDOCAINE HYDROCHLORIDE AND EPINEPHRINE 10 ML: 20; 5 INJECTION, SOLUTION EPIDURAL; INFILTRATION; INTRACAUDAL; PERINEURAL at 13:15

## 2018-07-18 RX ADMIN — KETOROLAC TROMETHAMINE 30 MG: 30 INJECTION, SOLUTION INTRAMUSCULAR at 17:01

## 2018-07-18 RX ADMIN — AZITHROMYCIN MONOHYDRATE 500 MG: 500 INJECTION, POWDER, LYOPHILIZED, FOR SOLUTION INTRAVENOUS at 13:21

## 2018-07-18 RX ADMIN — Medication 62.5 MILLI-UNITS/MIN: at 16:40

## 2018-07-18 RX ADMIN — DOCUSATE SODIUM 100 MG: 100 CAPSULE, LIQUID FILLED ORAL at 17:55

## 2018-07-18 RX ADMIN — ROPIVACAINE HYDROCHLORIDE 5 ML: 2 INJECTION, SOLUTION EPIDURAL; INFILTRATION at 06:50

## 2018-07-18 RX ADMIN — LIDOCAINE HYDROCHLORIDE AND EPINEPHRINE 5 ML: 20; 5 INJECTION, SOLUTION EPIDURAL; INFILTRATION; INTRACAUDAL; PERINEURAL at 13:32

## 2018-07-18 RX ADMIN — SODIUM CHLORIDE 5 MILLION UNITS: 0.9 INJECTION, SOLUTION INTRAVENOUS at 07:42

## 2018-07-18 RX ADMIN — ONDANSETRON HYDROCHLORIDE 4 MG: 2 INJECTION, SOLUTION INTRAVENOUS at 13:34

## 2018-07-18 RX ADMIN — LIDOCAINE HYDROCHLORIDE AND EPINEPHRINE 5 ML: 20; 5 INJECTION, SOLUTION EPIDURAL; INFILTRATION; INTRACAUDAL; PERINEURAL at 13:20

## 2018-07-18 RX ADMIN — Medication 250 MILLI-UNITS/MIN: at 14:35

## 2018-07-18 RX ADMIN — SODIUM CHLORIDE 2.5 MILLION UNITS: 9 INJECTION, SOLUTION INTRAVENOUS at 11:34

## 2018-07-18 RX ADMIN — MORPHINE SULFATE 3 MG: 0.5 INJECTION, SOLUTION EPIDURAL; INTRATHECAL; INTRAVENOUS at 14:07

## 2018-07-18 RX ADMIN — SIMETHICONE CHEW TAB 80 MG 80 MG: 80 TABLET ORAL at 17:56

## 2018-07-18 RX ADMIN — SODIUM CHLORIDE, SODIUM LACTATE, POTASSIUM CHLORIDE, AND CALCIUM CHLORIDE 125 ML/HR: .6; .31; .03; .02 INJECTION, SOLUTION INTRAVENOUS at 07:23

## 2018-07-18 RX ADMIN — ROPIVACAINE HYDROCHLORIDE 5 ML: 2 INJECTION, SOLUTION EPIDURAL; INFILTRATION at 06:52

## 2018-07-18 RX ADMIN — MORPHINE SULFATE 2 MG: 0.5 INJECTION, SOLUTION EPIDURAL; INTRATHECAL; INTRAVENOUS at 14:09

## 2018-07-18 RX ADMIN — Medication 250 MILLI-UNITS/MIN: at 13:59

## 2018-07-18 RX ADMIN — NITROGLYCERIN 500 MCG: 5 INJECTION, SOLUTION INTRAVENOUS at 13:52

## 2018-07-18 RX ADMIN — AZITHROMYCIN MONOHYDRATE 500 MG: 500 INJECTION, POWDER, LYOPHILIZED, FOR SOLUTION INTRAVENOUS at 13:19

## 2018-07-18 RX ADMIN — ROPIVACAINE HYDROCHLORIDE: 2 INJECTION, SOLUTION EPIDURAL; INFILTRATION at 06:55

## 2018-07-18 NOTE — PROGRESS NOTES
Progress Note - OB/GYN  Post-Op Check  Abelardo Chery 41 y o  female MRN: [id number]  Unit/Bed#: L&D 328-01 Encounter: [id number]      A/P:  Post-Op day # 0 status post Primary low transverse  section  1) Routine Post-op Care: Continue   2) Diet: Advance as tolerated  3) Ilon: D/c 12 hrs post-op then f/u 1st void  4) UOP: 400 cc charted over 2 hrs; color: concentrated  clear yellow   5) Labs: f/u CBC in am  6) DVT ppx: Cont   SCDs  7) Monitor vital signs    SUBJECTIVE:  Pain: minimal --at incision site only  Tolerating Oral Intake: is tolerating PO liquids and solids  Voiding: lion inserted  Flatus: no  Bowel Movement: no  Ambulating: No (lion still inserted)  Breastfeeding: Breastfeeding  Chest Pain: no  Shortness of Breath: no  Leg Pain/Discomfort: no  Lochia: minimal    Other:       OBJECTIVE:     Vitals:   Vitals:    18 1610 18 1620 18 1710 18 1822   BP: 158/65 132/74 114/66 134/65   BP Location: Left arm   Right arm   Pulse: 80  85    Resp:  18   Temp: 99 2 °F (37 3 °C)  99 5 °F (37 5 °C) 98 8 °F (37 1 °C)   TempSrc: Oral  Oral Oral   Weight:       Height:           I/O        07 -  0700  07 -  0700  07 -  0700    I V  (mL/kg)   2100 (23 3)    Total Intake(mL/kg)   2100 (23 3)    Urine (mL/kg/hr)   755 (0 7)    Blood   600    Total Output     1355    Net     +745                   Results from last 7 days  Lab Units 18  0634   WBC Thousand/uL 13 06*   HEMOGLOBIN g/dL 11 4*   MCV fL 93   PLATELETS Thousands/uL 231       Results from last 7 days  Lab Units 18  0634   NEUTROS PCT % 84*   MONOS PCT % 8   EOS PCT % 0               Invalid input(s): CA        Invalid input(s): DBILI, ALP    General: No Acute Distress  Cardiovascular: Regular Rate and Rhythm  Lungs: Clear to Auscultation Bilaterally, no wheezing, rhonchi or rales  Abdomen: Non-distended, no rebound or guarding  Fundus: Firm  Fundal Location: -1 cm below the umbilicus  Incision: sutures: clean, dry, and intact  Lower Extremities: Non-Tender  Other:    MEDS:   Current Facility-Administered Medications   Medication Dose Route Frequency    acetaminophen (TYLENOL) tablet 650 mg  650 mg Oral Q6H PRN    aluminum-magnesium hydroxide-simethicone (MYLANTA) 200-200-20 mg/5 mL oral suspension 15 mL  15 mL Oral Q6H PRN    benzocaine-menthol-lanolin-aloe (DERMOPLAST) 20-0 5 % topical spray 1 application  1 application Topical R4N PRN    calcium carbonate (TUMS) chewable tablet 1,000 mg  1,000 mg Oral Daily PRN    diphenhydrAMINE (BENADRYL) injection 25 mg  25 mg Intravenous Q6H PRN    docusate sodium (COLACE) capsule 100 mg  100 mg Oral BID    hydrocortisone 1 % cream 1 application  1 application Topical Daily PRN    ibuprofen (MOTRIN) tablet 600 mg  600 mg Oral Q6H PRN    ibuprofen (MOTRIN) tablet 600 mg  600 mg Oral Q6H PRN    ketorolac (TORADOL) injection 30 mg  30 mg Intravenous Q6H PRN    ondansetron (ZOFRAN) injection 4 mg  4 mg Intravenous Q8H PRN    [START ON 7/19/2018] oxyCODONE-acetaminophen (PERCOCET) 5-325 mg per tablet 1 tablet  1 tablet Oral Q4H PRN    [START ON 7/19/2018] oxyCODONE-acetaminophen (PERCOCET) 5-325 mg per tablet 2 tablet  2 tablet Oral Q4H PRN    oxytocin (PITOCIN) 30 Units in lactated ringers 500 mL infusion  62 5 sam-units/min Intravenous Titrated    [START ON 7/19/2018] prenatal multivitamin tablet 1 tablet  1 tablet Oral Daily    simethicone (MYLICON) chewable tablet 80 mg  80 mg Oral 4x Daily PRN    witch hazel-glycerin (TUCKS) topical pad 1 pad  1 pad Topical Q4H PRN     Invasive Devices     Peripheral Intravenous Line            Peripheral IV 07/18/18 Right Hand less than 1 day          Drain            Urethral Catheter Double-lumen; Latex 16 Fr  less than 1 day              Medication Administration - last 24 hours from 07/17/2018 1900 to 07/18/2018 1900       Date/Time Order Dose Route Action Action by     07/18/2018 1526 lactated ringers infusion   Intravenous Anesthesia Volume Adjustment Gayatri Bermudez, CRNA     07/18/2018 1512 lactated ringers infusion   Intravenous New Bag Gayatri Bermudez, CRNA     07/18/2018 1404 lactated ringers infusion   Intravenous New Bag Gayatri Bermudez, CRNA     07/18/2018 1241 lactated ringers infusion 999 mL/hr Intravenous Rate/Dose Change Argentina Mason, ODILIA     07/18/2018 1115 lactated ringers infusion 125 mL/hr Intravenous New [facility]     07/18/2018 1015 lactated ringers infusion 999 mL/hr Intravenous Rate/Dose Change Argentina Mason, ODILIA     07/18/2018 6751 lactated ringers infusion 125 mL/hr Intravenous [facility]     07/18/2018 5951 lactated ringers infusion 999 mL/hr Intravenous New [address] [place] [address]     07/18/2018 7071 ropivacaine 0 2% PCEA   Epidural New [address] [place] [address]     07/18/2018 5618 penicillin G (PFIZERPEN-G) in 0 9% sodium chloride 250 mL IVPB 5 Million Units 0 Million Units Intravenous Stopped Argentina Mason, ODILIA     07/18/2018 5127 penicillin G (PFIZERPEN-G) in 0 9% sodium chloride 250 mL IVPB 5 Million Units 5 Million Units Intravenous New Bag Argentina Mason, RN     07/18/2018 1134 penicillin G (PFIZERPEN-G) in 0 9% sodium chloride 100 mL IVPB 2 5 Million Units 2 5 Million Units Intravenous [address] Argentina Mason, ODILIA     07/18/2018 1321 azithromycin (ZITHROMAX) 500 mg in sodium chloride 0 9% 250mL IVPB 500 mg 500 mg Intravenous Given Gayatri Bermudez, ANI     07/18/2018 1319 azithromycin (ZITHROMAX) 500 mg in sodium chloride 0 9% 250mL IVPB 500 mg 500 mg Intravenous New [address] [facility] Jd Wade, ODILIA     07/18/2018 7195 docusate sodium (COLACE) capsule 100 mg 100 mg Oral Given Argentina Mason, RN     07/18/2018 9489 simethicone (MYLICON) chewable tablet 80 mg 80 mg Oral Given Argentina Mason, RN     07/18/2018 1640 oxytocin (PITOCIN) 30 Units in lactated ringers 500 mL infusion 62 5 sam-units/min Intravenous New Tyshawn Mason RN 07/18/2018 1701 ketorolac (TORADOL) injection 30 mg 30 mg Intravenous Given Sis Baca RN              Signature / Title: Isaias Haider DO, Resident - Ob/Gyn    Date: 7/18/2018  Time: 7:00 PM

## 2018-07-18 NOTE — PLAN OF CARE
BIRTH - VAGINAL/ SECTION     Fetal and maternal status remain reassuring during the birth process Completed     Emotionally satisfying birthing experience for mother/fetus Completed        Knowledge Deficit     Verbalizes understanding of labor plan Completed        Labor & Delivery     Manages discomfort Completed     Patient vital signs are stable Completed          PAIN - ADULT     Verbalizes/displays adequate comfort level or baseline comfort level Progressing        POSTPARTUM     Experiences normal postpartum course Progressing     Appropriate maternal -  bonding Progressing     Establishment of infant feeding pattern Progressing     Incision(s), wounds(s) or drain site(s) healing without S/S of infection Progressing        SAFETY ADULT     Patient will remain free of falls Progressing     Maintain or return to baseline ADL function Progressing     Maintain or return mobility status to optimal level Progressing (2) assistive person

## 2018-07-18 NOTE — ANESTHESIA PREPROCEDURE EVALUATION
Review of Systems/Medical History          Cardiovascular  Negative cardio ROS    Pulmonary  Asthma ,        GI/Hepatic  Negative GI/hepatic ROS               Endo/Other  Negative endo/other ROS      GYN       Hematology  Negative hematology ROS      Musculoskeletal  Negative musculoskeletal ROS        Neurology  Negative neurology ROS      Psychology   Negative psychology ROS              Physical Exam    Airway    Mallampati score: I  TM Distance: >3 FB  Neck ROM: full     Dental       Cardiovascular  Comment: Negative ROS, Rhythm: regular, Rate: normal, Cardiovascular exam normal    Pulmonary  Pulmonary exam normal     Other Findings        Anesthesia Plan  ASA Score- 2     Anesthesia Type- epidural with ASA Monitors  Additional Monitors:   Airway Plan:         Plan Factors-    Induction-     Postoperative Plan-     Informed Consent- Anesthetic plan and risks discussed with patient

## 2018-07-18 NOTE — PLAN OF CARE
BIRTH - VAGINAL/ SECTION     Fetal and maternal status remain reassuring during the birth process [de-identified]     Emotionally satisfying birthing experience for mother/fetus Progressing        Knowledge Deficit     Verbalizes understanding of labor plan Progressing        Labor & Delivery     Manages discomfort Progressing     Patient vital signs are stable Progressing        PAIN - ADULT     Verbalizes/displays adequate comfort level or baseline comfort level Progressing        SAFETY ADULT     Patient will remain free of falls Progressing     Maintain or return to baseline ADL function Progressing     Maintain or return mobility status to optimal level Progressing

## 2018-07-18 NOTE — CASE MANAGEMENT
Notification of Maternity Inpatient Admission/Maternity Inpatient Authorization Request  This is a Notification of Maternity Inpatient Admission/Maternity Inpatient Authorization Request to our facility [address]  Please be advised that this patient is currently in our facility under Inpatient Status  Below you will find the Birth/Hartman Summary, Attending Physician and Facilitys information including NPI# and contact for the Utilization  assigned to the [facility] where the patient is receiving services  Please feel free to contact the Utilization Review Department with any questions  Mothers Information:  Jenny Chambers  MRN: [id number]  YOB: 1980  Admission Date: 2018  5:43 AM  Discharge Date: No discharge date for patient encounter  Disposition:   Admitting Diagnosis: O82  DELIVERY   Information:  Estimated Date of Delivery: 8/3/18  Information for the patient's :  Zenaida Score  [facility]) [[id number]]     Hartman Delivery Information:  Sex: male  Delivered 2018 1:58 PM by , Low Transverse; Gestational Age: 37w6d    Hartman Measurements:  Weight: 6 lb 10 oz (3005 g); Height: 19 5"    APGAR 1 minute 5 minutes 10 minutes   Totals: 2 8      OB History      Para Term  AB Living    1 1 1 0 0 1    SAB TAB Ectopic Multiple Live Births          0 1        Attending Physician:  MESSI Castro  Specialty- Obstetrics and Gynecology  [facility] ID- [id number]  [address] [place] [address] [address]   Phone 1: [phone]  Fax: [phone]    Facility: [place] [address] [address] [facility]  [phone]  Tax ID: [id number]  NPI: [id number]    [address] [place] [address] in the [facility] by Jens Grace for 2017  Network Utilization Review Department  Phone: [phone];  Fax [phone]  ATTENTION: The Network Utilization Review Department is now centralized for our 7 Facilities  Make a note that we have a new phone and fax numbers for our Department  Please call with any questions or concerns to [phone] and carefully follow the prompts so that you are directed to the right person  All voicemails are confidential  Fax any determinations, approvals, denials, and requests for initial or continue stay review clinical to [phone]  **Due to HIGH CALL volume, it would be easier if you could please send daily logs  This will expedite your requests and in part, help us provide discharge notifications faster   **

## 2018-07-18 NOTE — OP NOTE
OPERATIVE REPORT  PATIENT NAME: Kathryn Hernandez    :  1980  MRN: [id number]  Pt Location: [facility] L&D OR ROOM 01    SURGERY DATE: 2018    Surgeon(s) and Role:     * Gail Farr MD - Primary     * Guanako Salguero DO - Assisting    Preop Diagnosis:  Arrest of descent  Maternal request for  section  Desire for permanent sterilization    Procedure(s) (LRB):   SECTION () (N/A)  LIGATION/COAGULATION TUBAL (N/A)    Specimen(s):  ID Type Source Tests Collected by Time Destination   1 :  Tissue Fallopian Tube, Right TISSUE EXAM Gail Farr MD 2018 1341    2 :  Tissue Fallopian Tube, Left TISSUE EXAM Gail Farr MD 2018 1341    A : for pathology Tissue (Placenta on Hold) OB Only Placenta PLACENTA IN STORAGE Gail Farr MD 2018 1339    B :  Cord Blood Cord BLOOD GAS, VENOUS, CORD, BLOOD GAS, ARTERIAL, CORD Gail Farr MD 2018 1339      Arterial cord pH 7 164, base excess 24 5  Venous cord pH 7 214, base excess 24 4    Estimated Blood Loss:   600 mL    Drains:  Urethral Catheter Double-lumen; Latex 16 Fr  (Active)   Site Assessment Skin intact 2018  4:10 PM   Collection Container Standard drainage bag 2018  4:10 PM   Securement Method Securing device (Describe) 2018  4:10 PM   Output (mL) 100 mL 2018  4:21 PM   Number of days: 0     PROCEDURE  The patient was taken to the operating room where a time out was performed to confirm correct patient and correct procedure  Epidural anesthesia was rebolused by anesthesia and 2gm Ancef and 500mg Azithromycin was given for preoperative infection prophylaxis  The patient was then placed in a supine position with a left lateral tilt  Gudino catheter was already in place  Fetal heart tones were noted to be normal  The patient was then prepped and draped in the usual sterile fashion for a transverse incision skin incision  An incision was made in the skin with a surgical scalpel   Sharp and blunt dissection was used to reach the level of the rectus fascia  Bovie electrocautery was utilitzed for hemostasis during this process  The fascia was incised transversely at the midline and the fascial incision was extended bilaterally using blunt dissection and sharp dissection  The superior edge of the fascial incision was grasped with Kocher clamps, tented up and the underlying rectus muscles were dissected off bluntly and sharply using the scissors  The inferior edge of the fascial incision was then dissection off the rectus muscles with blunt and sharp dissection  The rectus muscles were then divided at the midline  The peritoneum was identified, tented up at its upper margin taking care to avoid the bladder, and then entered with blunt dissection  The peritoneal incision and rectus muscle were extended bilaterally bluntly with gentle traction  The bladder blade was inserted  Then, a transverse incision was made in the lower uterine segment using a new surgical blade  The uterine incision was extended cephalad and caudal using blunt dissection  The amniotic sac was entered bluntly and the amniotic fluid was noted to be clear  The surgeon's hand was placed into the uterine cavity  The fetus was noted to be in cephalic presentation and deeply wedged into the maternal pelvis  Attempts were made to disengage the fetal head with some difficulty  Support staff were instructed to put on a sterile glove to push from below and disengage the fetal head  A small 2cm T-incision was made midline at the uterus  The fetal head was then disengaged from below, suction was broken, and the attending provider was able to deliver the fetal head through the hysterotomy without difficulty with the assistance of fundal pressure  No nuchal cord was identified  The infant's oral and nasal passages were bulb suctioned  There was noted to be poor fetal tone and no initial cry   The cord was doubly clamped and cut and immediately handed off to awaiting  staff  Venous and arterial blood gas, cord blood, and portion of cord was obtained for analysis and routine blood testing   was then noted to begin to cry spontaneously  The placenta delivery was spontaneous  Placenta was noted to be intact with a centrally inserted three-vessel cord  Oxytocin was administered by IV infusion to enhance uterine contraction  The uterus was exteriorized and cleared of all clots and debris by blunt curretage with a moist lap sponge  The uterine incision was reapproximated using a 0 Vicryl in a running locked fashion  A second vertical imbricating stitch with 0 Vicryl was applied with care to re-approximate the T-incision and avoid damage to the bladder and surrounding tissues  The uterine incision was examined and noted to be slightly oozy  Surgifoam dressing was applied and bleeding was noted to be hemostatic  The patient's Gudino was noted to be dark orange in color with no ana luisa blood  Decision was made to backfill the patient's bladder with sterile formula  No evidence of a cystotomy was noted and there was no leakage of fluid from within the patient's cavity  At this point, our attention turned to the adnexa  The bilateral Fallopian tubes were identified  First the right Fallopian tube was identified and grasped with a Jef Simper in an avascular area and tented up  Using a 2-0 Plain suture this tube was doubly ligated in a standard [place] technique  Good hemostasis was noted from this tube  The left Fallopian tube was identified and doubly ligated in the same manner  Good hemostasis was noted on this side  At this point, the posterior cul-de-sac was cleared of all clots  The uterus was replaced into the abdomen and the paracolic gutters were cleared of all clots  The uterine incision and bilateral tubal ligation sits were once again reexamined and noted to be hemostatic   The fascia was then reapproximated using 0 Vicryl in a running nonlocked fashion  The subcutaneous tissue was irrigated and cleared of all clots and debris  Good hemostasis was achieved with Bovie electrocautery  The subcutaneous tissue was reapproximated with 2-0 Plain suture  The skin incision was closed with 4-0 monocryl  Good hemostasis was noted  Steri-strips and benzoin weres placed on top of the skin incision in a sterile fashion as a surgical dressing  All needle, sponge, and instrument counts were noted to be correct x 2 at the end of the procedure  The patient was then cleansed and transferred to the recovery room  Overall, the patient tolerated the procedure well and is currently in stable condition in the PACU with her   Dr Eduar Viera was present for the entire procedure        Josue López DO  PGY-2 OB/GYN   2018 4:36 PM

## 2018-07-18 NOTE — OB LABOR/OXYTOCIN SAFETY PROGRESS
Labor Progress Note - Kirsten Ng 41 y o  female MRN: [id number]    Unit/Bed#: L&D 325-01 Encounter: [id number]    Obstetric History       T0      L0     SAB0   TAB0   Ectopic0   Multiple0   Live Births0      Gestational Age: 37w6d     Contraction Frequency (minutes): 2-3  Contraction Quality: Moderate  Tachysystole: No   Dilation: 10  Dilation Complete Date: 18  Dilation Complete Time: 0700  Effacement (%): 100  Station: 2  Baseline Rate: 135 bpm     FHR Category: Category I          Notes/comments:   AROM done   Comfortable  Will start pushing if doesn't start feeling pressure in 30 min             Laurie Roth MD 2018 10:55 AM

## 2018-07-18 NOTE — DISCHARGE SUMMARY
CS Discharge Summary - Allen Jackson 41 y o  female MRN: [id number]    Unit/Bed#: L&D 325-01 Encounter: [id number]    Admission Date: 2018     Discharge Date: 2018    Admitting Diagnosis:   1  IUP at 37w5d  2  GBS positive status  3  Active labor  4  AMA  5  Desire for permanent sterilization    Discharge Diagnosis:   Same, delivered    Procedures:   repeat  section, low transverse incision   Bilateral tubal ligation    Admitting Attending: Dr Binh Bernard  Delivery Attending: Dr Binh Bernard  Discharge Attending: Dr Caitlin Mejia:     Allen Jackson is a 41 y o   who was admitted at 37w5d for active labor  She was found to be 9/100/0 station and intact upon arrival to the L&D  She was started on PCN for GBS positive status and prophylaxis  Pt was AROM'd at [phone] for clear moderate fluid s/p epidural for analgesia  She became complete and pushed for 1h 30m before requesting a  section  The patient's private attending spoke with her and her partner and counseled on the benefits of a vacuum delivery  Pt adamantly refused the vacuum delivery, knowing fully the risks, benefits, and alternatives of a vacuum delivery vs a  section  She also stated that she desired to have her tubes tied and wanted permanent sterilization  She then underwent an uncomplicated non-emergent  section and delivered a viable male  at 46  APGARS were 2, 8 at 1 and 5 minutes, respectively   weighed 6lb 10oz  Placenta was delivered at 1400   was then transferred to  nursery  Patient tolerated the procedure well and was transferred to recovery in stable condition  The patient's post partum course was unremarkable    Preoperative hemaglobin was 11 4, postoperative was 9 4  Her postoperative pain was well controlled with oral analgesics  On day of discharge, she was ambulating and able to reasonably perform all ADLs   She was voiding and had appropriate bowel function  Pain was well controlled  She was discharged home on post-operative day #2 without complications  Patient was instructed to follow up with her OB as an outpatient and was given appropriate warnings to call provider if she develops signs of infection or uncontrolled pain  Complications:   None    Condition at discharge:   stable     Provisions for Follow-Up Care:  See after visit summary for information related to follow-up care and any pertinent home health orders  Disposition:   Home    Planned Readmission:   No    Discharge Medications:   Prenatal vitamin daily for 6 months or the duration of nursing whichever is longer  Motrin 600 mg orally every 6 hours as needed for pain  Tylenol (over the counter) per bottle directions as needed for pain  Hydrocortisone cream 1% (over the counter) applied 1-2x daily to hemorrhoids as needed  Witch hazel pads for hemorrhoidal discomfort as needed      Discharge instructions :   -Do not place anything (no partner, tampons or douche) in your vagina for 6 weeks  -You may walk for exercise for the first 6 weeks then gradually return to your usual activities    -Please do not drive for 1 week if you have no stitches and for 2 weeks if you have stitches or underwent a  delivery     -You may take baths or shower per your preference    -Please look at your bust (breasts) in the mirror daily and call provider for redness or tenderness or increased warmth  - If you have had a  please look at your incision daily as well and call provider for increasing redness or steady drainage from the incision    -Please call your provider if temperature > 100 4*F or 38* C, worsening pain or a foul discharge

## 2018-07-18 NOTE — H&P
H&P Exam - Obstetrics   [facility] 41 y o  female MRN: [id number]  Unit/Bed#: L&D 325-01 Encounter: [id number]    Assessment/Plan     Assessment:  45-year-old  at 37 5 weeks, A positive, GBS positive, in active labor    Plan:  Admit  IV access  CBC, RPR, type and screen  Epidural upon request  Penicillin for GBS positive status  Expectant management of labor    History of Present Illness   Chief Complaint: Active labor    HPI:  [facility] is a 41 y o   female with an POPEYE of 8/3/2018, by Ultrasound at 37w5d weeks gestation who is being admitted for Active labor  Her current obstetrical history is significant for advanced maternal age, GBS positive  Contractions:  Every 5 minutes, very painful  Leakage of fluid: None  Bleeding: None  Fetal movement: present  Pregnancy complications:   Advanced maternal age, GBS positive  Review of Systems   Constitutional: Negative  HENT: Negative  Respiratory: Negative  Cardiovascular: Negative  Gastrointestinal:        Uterine contractions every 5 minutes   Genitourinary: Negative  Musculoskeletal: Negative  All other systems reviewed and are negative        Historical Information   OB History    Para Term  AB Living   1 0 0 0 0 0   SAB TAB Ectopic Multiple Live Births                  # Outcome Date GA Lbr Odin/2nd Weight Sex Delivery Anes PTL Lv   1 Current                 Baby complications/comments:   Past Medical History:   Diagnosis Date    Asthma     as child     Past Surgical History:   Procedure Laterality Date    TONSILLECTOMY      18 y o   [de-identified] WISDOM TOOTH EXTRACTION      18 y o     Social History   History   Alcohol Use No     History   Drug Use No     History   Smoking Status    Never Smoker   Smokeless Tobacco    Never Used     Family History: non-contributory    Meds/Allergies   all medications and allergies reviewed  No Known Allergies    Objective   Vitals: Blood pressure 153/64, pulse 62, temperature 97 7 °F (36 5 °C), temperature source Oral, resp  rate 18, height 5' 10" (1 778 m), weight 90 3 kg (199 lb), last menstrual period 10/20/2017, not currently breastfeeding  Body mass index is 28 55 kg/m²  Invasive Devices          No matching active lines, drains, or airways          Physical Exam   Constitutional: She is oriented to person, place, and time  She appears well-developed and well-nourished  HENT:   Head: Normocephalic and atraumatic  Cardiovascular: Normal rate, regular rhythm and normal heart sounds  Pulmonary/Chest: Effort normal and breath sounds normal    Abdominal: Soft  Bowel sounds are normal    Gravid   Genitourinary: Vagina normal and uterus normal    Genitourinary Comments: SVE 7/90/0 per Dr Terra Quintanilla   Neurological: She is alert and oriented to person, place, and time  Prenatal Labs:   Blood type:  A positive  Antibody screen:  Negative  RPR:  Negative  HIV:  Negative  Hepatitis-B:  Negative  Rubella:  Nonimmune  GC chlamydia:  Not found  Group B strep positive  Glucola:  146  3 hour glucose tolerance test:  Fasting 75, 1 hour 127, 2 hour 141, 3 hour 87      Imaging, EKG, Pathology, and Other Studies: I have personally reviewed pertinent reports

## 2018-07-18 NOTE — OB LABOR/OXYTOCIN SAFETY PROGRESS
Labor Progress Note - Elicia Zeng 41 y o  female MRN: [id number]    Unit/Bed#: L&D 325-01 Encounter: [id number]    Obstetric History       T0      L0     SAB0   TAB0   Ectopic0   Multiple0   Live Births0      Gestational Age: 37w6d     Contraction Frequency (minutes): 2-3  Contraction Quality: Moderate  Tachysystole: No   Dilation: 10  Dilation Complete Date: 18  Dilation Complete Time: 0700  Effacement (%): 100  Station: 3  Baseline Rate: 135 bpm     FHR Category: Category I          Notes/comments:   Patient pushing with good maternal effort, however, there were prolonged decelerations with pushing  We discussed options of using a vacuum for an operative delivery or proceed with C/S  Risks of each procedure discussed with patient and   All questions answered  Requesting 1LTCS  As previously discussed, patient would like a tubal ligation as well              Juan Daniel Lafleur MD 2018 1:22 PM

## 2018-07-18 NOTE — ANESTHESIA PROCEDURE NOTES
Epidural Block    Patient location during procedure: OB  Start time: 7/18/2018 6:40 AM  Reason for block: at surgeon's request and primary anesthetic  Staffing  Anesthesiologist: Renita Moreno  Performed: anesthesiologist   Preanesthetic Checklist  Completed: patient identified, site marked, surgical consent, pre-op evaluation, timeout performed, IV checked, risks and benefits discussed and monitors and equipment checked  Epidural  Patient position: sitting  Prep: Betadine  Patient monitoring: frequent blood pressure checks  Approach: midline  Location: lumbar (1-5)  Injection technique: LEIGHA saline  Needle  Needle type: Tuohy   Needle gauge: 18 G  Catheter type: side hole  Catheter size: 20 G  Test dose: negative  Assessment  Sensory level: [id number] aspiration for CSF, negative aspiration for heme and no paresthesia on injection  patient tolerated the procedure well with no immediate complications

## 2018-07-19 LAB
BASOPHILS # BLD AUTO: 0.01 THOUSANDS/ΜL (ref 0–0.1)
BASOPHILS NFR BLD AUTO: 0 % (ref 0–1)
EOSINOPHIL # BLD AUTO: 0.03 THOUSAND/ΜL (ref 0–0.61)
EOSINOPHIL NFR BLD AUTO: 0 % (ref 0–6)
ERYTHROCYTE [DISTWIDTH] IN BLOOD BY AUTOMATED COUNT: 13.7 % (ref 11.6–15.1)
HCT VFR BLD AUTO: 28.4 % (ref 34.8–46.1)
HGB BLD-MCNC: 9.5 G/DL (ref 11.5–15.4)
LYMPHOCYTES # BLD AUTO: 1 THOUSANDS/ΜL (ref 0.6–4.47)
LYMPHOCYTES NFR BLD AUTO: 9 % (ref 14–44)
MCH RBC QN AUTO: 31.3 PG (ref 26.8–34.3)
MCHC RBC AUTO-ENTMCNC: 33.5 G/DL (ref 31.4–37.4)
MCV RBC AUTO: 93 FL (ref 82–98)
MONOCYTES # BLD AUTO: 1.1 THOUSAND/ΜL (ref 0.17–1.22)
MONOCYTES NFR BLD AUTO: 9 % (ref 4–12)
NEUTROPHILS # BLD AUTO: 9.55 THOUSANDS/ΜL (ref 1.85–7.62)
NEUTS SEG NFR BLD AUTO: 82 % (ref 43–75)
NRBC BLD AUTO-RTO: 0 /100 WBCS
PLATELET # BLD AUTO: 191 THOUSANDS/UL (ref 149–390)
PMV BLD AUTO: 9.7 FL (ref 8.9–12.7)
RBC # BLD AUTO: 3.04 MILLION/UL (ref 3.81–5.12)
RPR SER QL: NORMAL
WBC # BLD AUTO: 11.69 THOUSAND/UL (ref 4.31–10.16)

## 2018-07-19 PROCEDURE — 99024 POSTOP FOLLOW-UP VISIT: CPT | Performed by: OBSTETRICS & GYNECOLOGY

## 2018-07-19 PROCEDURE — 85025 COMPLETE CBC W/AUTO DIFF WBC: CPT | Performed by: OBSTETRICS & GYNECOLOGY

## 2018-07-19 RX ORDER — SODIUM CHLORIDE, SODIUM LACTATE, POTASSIUM CHLORIDE, CALCIUM CHLORIDE 600; 310; 30; 20 MG/100ML; MG/100ML; MG/100ML; MG/100ML
125 INJECTION, SOLUTION INTRAVENOUS CONTINUOUS
Status: DISCONTINUED | OUTPATIENT
Start: 2018-07-19 | End: 2018-07-20 | Stop reason: HOSPADM

## 2018-07-19 RX ADMIN — OXYCODONE HYDROCHLORIDE AND ACETAMINOPHEN 2 TABLET: 5; 325 TABLET ORAL at 22:28

## 2018-07-19 RX ADMIN — Medication 1 TABLET: at 08:40

## 2018-07-19 RX ADMIN — DOCUSATE SODIUM 100 MG: 100 CAPSULE, LIQUID FILLED ORAL at 17:59

## 2018-07-19 RX ADMIN — OXYCODONE HYDROCHLORIDE AND ACETAMINOPHEN 1 TABLET: 5; 325 TABLET ORAL at 13:59

## 2018-07-19 RX ADMIN — DOCUSATE SODIUM 100 MG: 100 CAPSULE, LIQUID FILLED ORAL at 08:40

## 2018-07-19 RX ADMIN — KETOROLAC TROMETHAMINE 30 MG: 30 INJECTION, SOLUTION INTRAMUSCULAR at 08:40

## 2018-07-19 RX ADMIN — KETOROLAC TROMETHAMINE 30 MG: 30 INJECTION, SOLUTION INTRAMUSCULAR at 02:04

## 2018-07-19 RX ADMIN — SODIUM CHLORIDE, SODIUM LACTATE, POTASSIUM CHLORIDE, AND CALCIUM CHLORIDE 125 ML/HR: .6; .31; .03; .02 INJECTION, SOLUTION INTRAVENOUS at 00:15

## 2018-07-19 RX ADMIN — OXYCODONE HYDROCHLORIDE AND ACETAMINOPHEN 2 TABLET: 5; 325 TABLET ORAL at 17:59

## 2018-07-19 NOTE — PLAN OF CARE
PAIN - ADULT     Verbalizes/displays adequate comfort level or baseline comfort level Progressing        POSTPARTUM     Experiences normal postpartum course Progressing     Appropriate maternal -  bonding Progressing     Establishment of infant feeding pattern Progressing     Incision(s), wounds(s) or drain site(s) healing without S/S of infection Progressing        SAFETY ADULT     Patient will remain free of falls Progressing     Maintain or return to baseline ADL function Progressing     Maintain or return mobility status to optimal level Progressing

## 2018-07-19 NOTE — PROGRESS NOTES
Progress Note - OB/GYN   [facility] 41 y o  female MRN: [id number]  Unit/Bed#: L&D 310-01 Encounter: [id number]      [facility] is a patient of Drs  AC    Assessment:  Post operative day #1 s/p 1LTCS and BTL for arrest of descent, stable, and doing well    Plan:  1  Hemodynamically stable   - Pre-op Hb 11 4 --> post-op Hb 9 5   - Vitals WNL, currently asymptomatic  2  Gudino catheter   - Removed this am   - Making adequate urine output   - F/u voiding trial  3  Continue routine post partum care   - Encourage ambulation   - Encourage breastfeeding  4  Continue current meds   - See list below   - Pain adequately controlled with PO analgesics  5  Disposition   - Stable   - Anticipate discharge home POD#3    Subjective/Objective     Chief Complaint:     Post operative day #1 from a 1LTCS with no complaints today    Subjective:     Pain: no  Tolerating Oral Intake: yes  Voiding: yes  Flatus: yes  Bowel Movement: no  Ambulating: yes  Breastfeeding: Breastfeeding  Chest Pain: no  Shortness of Breath: no  Leg Pain/Discomfort: no  Lochia: minimal    Objective:   Vitals: /72 (BP Location: Right arm)   Pulse 83   Temp 99 °F (37 2 °C) (Oral)   Resp 18   Ht 5' 10" (1 778 m)   Wt 90 3 kg (199 lb)   LMP 10/20/2017 (Exact Date)   SpO2 96%   Breastfeeding?  No   BMI 28 55 kg/m²       Intake/Output Summary (Last 24 hours) at 07/19/18 0657  Last data filed at 07/19/18 0500   Gross per 24 hour   Intake             2100 ml   Output             2555 ml   Net             -455 ml       Lab Results   Component Value Date    WBC 11 69 (H) 07/19/2018    HGB 9 5 (L) 07/19/2018    HCT 28 4 (L) 07/19/2018    MCV 93 07/19/2018     07/19/2018       Meds/Allergies   Current Facility-Administered Medications   Medication Dose Route Frequency    acetaminophen (TYLENOL) tablet 650 mg  650 mg Oral Q6H PRN    aluminum-magnesium hydroxide-simethicone (MYLANTA) 200-200-20 mg/5 mL oral suspension 15 mL  15 mL Oral Q6H PRN    benzocaine-menthol-lanolin-aloe (DERMOPLAST) 20-0 5 % topical spray 1 application  1 application Topical B8V PRN    calcium carbonate (TUMS) chewable tablet 1,000 mg  1,000 mg Oral Daily PRN    diphenhydrAMINE (BENADRYL) injection 25 mg  25 mg Intravenous Q6H PRN    docusate sodium (COLACE) capsule 100 mg  100 mg Oral BID    hydrocortisone 1 % cream 1 application  1 application Topical Daily PRN    ibuprofen (MOTRIN) tablet 600 mg  600 mg Oral Q6H PRN    ibuprofen (MOTRIN) tablet 600 mg  600 mg Oral Q6H PRN    ketorolac (TORADOL) injection 30 mg  30 mg Intravenous Q6H PRN    lactated ringers infusion  125 mL/hr Intravenous Continuous    ondansetron (ZOFRAN) injection 4 mg  4 mg Intravenous Q8H PRN    oxyCODONE-acetaminophen (PERCOCET) 5-325 mg per tablet 1 tablet  1 tablet Oral Q4H PRN    oxyCODONE-acetaminophen (PERCOCET) 5-325 mg per tablet 2 tablet  2 tablet Oral Q4H PRN    prenatal multivitamin tablet 1 tablet  1 tablet Oral Daily    simethicone (MYLICON) chewable tablet 80 mg  80 mg Oral 4x Daily PRN    witch hazel-glycerin (TUCKS) topical pad 1 pad  1 pad Topical Q4H PRN       Physical Exam:  General: in no apparent distress, well developed and well nourished and non-toxic  Cardiovascular: Cor RRR  Lungs: clear to auscultation bilaterally  Abdomen: abdomen is soft without significant tenderness, masses, organomegaly or guarding; incision c/d/i closed with running absorbable suture  Fundus: Firm and non-tender, 1 cm above the umbilicus  Lower extremeties: nontender, SCDs in place bilaterally    Labs/Tests:   Recent Results (from the past 24 hour(s))   Blood gas, venous, cord    Collection Time: 07/18/18  1:39 PM   Result Value Ref Range    pH, Cord Bunny 7 214 7 190 - 7 490    pCO2, Cord Bunny 61 8 (H) 27 0 - 43 0 mm HG    pO2, Cord Bunny 10 3 (L) 15 0 - 45 0 mm HG    HCO3, Cord Bunny 24 4 12 2 - 28 6 mmol/L    Base Exc, Cord Bunny -4 8 (L) 1 0 - 9 0 mmol/L    O2 Cont, Cord Bunny 2 9 mL/dL    O2 HGB,VENOUS CORD 12 2 %   Blood gas, arterial, cord    Collection Time: 07/18/18  1:39 PM   Result Value Ref Range    pH, Cord Art 7 164 (L) 7 230 - 7 430    pCO2, Cord Art 68 0 (H) 30 0 - 60 0    pO2, Cord Art <13 0 5 0 - 25 0 mm HG    HCO3, Cord Art <0 0 (L) 17 3 - 27 3 mmol/L    Base Exc, Cord Art 24 5 (H) 3 0 - 11 0 mmol/L    O2 Content, Cord Art 27 0 ml/dl    O2 Hgb, Arterial Cord 0 0 %   CBC and differential    Collection Time: 07/19/18  4:52 AM   Result Value Ref Range    WBC 11 69 (H) 4 31 - 10 16 Thousand/uL    RBC 3 04 (L) 3 81 - 5 12 Million/uL    Hemoglobin 9 5 (L) 11 5 - 15 4 g/dL    Hematocrit 28 4 (L) 34 8 - 46 1 %    MCV 93 82 - 98 fL    MCH 31 3 26 8 - 34 3 pg    MCHC 33 5 31 4 - 37 4 g/dL    RDW 13 7 11 6 - 15 1 %    MPV 9 7 8 9 - 12 7 fL    Platelets 608 615 - 085 Thousands/uL    nRBC 0 /100 WBCs    Neutrophils Relative 82 (H) 43 - 75 %    Lymphocytes Relative 9 (L) 14 - 44 %    Monocytes Relative 9 4 - 12 %    Eosinophils Relative 0 0 - 6 %    Basophils Relative 0 0 - 1 %    Neutrophils Absolute 9 55 (H) 1 85 - 7 62 Thousands/µL    Lymphocytes Absolute 1 00 0 60 - 4 47 Thousands/µL    Monocytes Absolute 1 10 0 17 - 1 22 Thousand/µL    Eosinophils Absolute 0 03 0 00 - 0 61 Thousand/µL    Basophils Absolute 0 01 0 00 - 0 10 Thousands/µL       MEDS:   Current Facility-Administered Medications   Medication Dose Route Frequency    acetaminophen (TYLENOL) tablet 650 mg  650 mg Oral Q6H PRN    aluminum-magnesium hydroxide-simethicone (MYLANTA) 200-200-20 mg/5 mL oral suspension 15 mL  15 mL Oral Q6H PRN    benzocaine-menthol-lanolin-aloe (DERMOPLAST) 20-0 5 % topical spray 1 application  1 application Topical X9F PRN    calcium carbonate (TUMS) chewable tablet 1,000 mg  1,000 mg Oral Daily PRN    diphenhydrAMINE (BENADRYL) injection 25 mg  25 mg Intravenous Q6H PRN    docusate sodium (COLACE) capsule 100 mg  100 mg Oral BID    hydrocortisone 1 % cream 1 application  1 application Topical Daily PRN    ibuprofen (MOTRIN) tablet 600 mg  600 mg Oral Q6H PRN    ibuprofen (MOTRIN) tablet 600 mg  600 mg Oral Q6H PRN    ketorolac (TORADOL) injection 30 mg  30 mg Intravenous Q6H PRN    lactated ringers infusion  125 mL/hr Intravenous Continuous    ondansetron (ZOFRAN) injection 4 mg  4 mg Intravenous Q8H PRN    oxyCODONE-acetaminophen (PERCOCET) 5-325 mg per tablet 1 tablet  1 tablet Oral Q4H PRN    oxyCODONE-acetaminophen (PERCOCET) 5-325 mg per tablet 2 tablet  2 tablet Oral Q4H PRN    prenatal multivitamin tablet 1 tablet  1 tablet Oral Daily    simethicone (MYLICON) chewable tablet 80 mg  80 mg Oral 4x Daily PRN    witch hazel-glycerin (TUCKS) topical pad 1 pad  1 pad Topical Q4H PRN     Invasive Devices     Peripheral Intravenous Line            Peripheral IV 07/18/18 Right Hand 1 day                Milad Hernandez DO  PGY-2 OB/GYN   7/19/2018 6:57 AM

## 2018-07-20 VITALS
BODY MASS INDEX: 28.49 KG/M2 | WEIGHT: 199 LBS | HEART RATE: 75 BPM | DIASTOLIC BLOOD PRESSURE: 72 MMHG | TEMPERATURE: 98.6 F | HEIGHT: 70 IN | RESPIRATION RATE: 16 BRPM | OXYGEN SATURATION: 98 % | SYSTOLIC BLOOD PRESSURE: 107 MMHG

## 2018-07-20 RX ORDER — OXYCODONE HYDROCHLORIDE AND ACETAMINOPHEN 5; 325 MG/1; MG/1
1-2 TABLET ORAL EVERY 4 HOURS PRN
Qty: 30 TABLET | Refills: 0 | Status: SHIPPED | OUTPATIENT
Start: 2018-07-20 | End: 2018-07-30

## 2018-07-20 RX ORDER — IBUPROFEN 600 MG/1
600 TABLET ORAL EVERY 6 HOURS PRN
Qty: 50 TABLET | Refills: 1 | Status: SHIPPED | OUTPATIENT
Start: 2018-07-20 | End: 2018-12-03 | Stop reason: ALTCHOICE

## 2018-07-20 RX ADMIN — OXYCODONE HYDROCHLORIDE AND ACETAMINOPHEN 2 TABLET: 5; 325 TABLET ORAL at 11:07

## 2018-07-20 RX ADMIN — OXYCODONE HYDROCHLORIDE AND ACETAMINOPHEN 2 TABLET: 5; 325 TABLET ORAL at 06:56

## 2018-07-20 RX ADMIN — Medication 1 TABLET: at 07:46

## 2018-07-20 RX ADMIN — DOCUSATE SODIUM 100 MG: 100 CAPSULE, LIQUID FILLED ORAL at 07:46

## 2018-07-20 RX ADMIN — OXYCODONE HYDROCHLORIDE AND ACETAMINOPHEN 2 TABLET: 5; 325 TABLET ORAL at 02:18

## 2018-07-20 NOTE — PLAN OF CARE
PAIN - ADULT     Verbalizes/displays adequate comfort level or baseline comfort level Completed        POSTPARTUM     Experiences normal postpartum course Completed     Appropriate maternal -  bonding Completed     Establishment of infant feeding pattern Completed     Incision(s), wounds(s) or drain site(s) healing without S/S of infection Completed        SAFETY ADULT     Patient will remain free of falls Completed     Maintain or return to baseline ADL function Completed     Maintain or return mobility status to optimal level Completed

## 2018-07-20 NOTE — SOCIAL WORK
Cm met with MOB and FOB at bedside to introduce role and to perform the following SW assessment  MOB gave birth to baby boy, Behzad Members  FOB is Kathy Skaggs, this is the first child for MOB and FOB  MOB states she has all necessary supplies for baby upon discharge  MOB will be bottle feeding  [place] [facility], Cm made parents aware of making sure they reach out to insurance within 30 days to avoid a gap in care for baby after 30 days  MOB will be using [facility] as pediatrician and will make well baby appointment immediately       No social needs identified

## 2018-07-20 NOTE — PROGRESS NOTES
Progress Note - OB/GYN   [facility] 41 y o  female MRN: [id number]  Unit/Bed#: L&D 310-01 Encounter: [id number]      [facility] is a patient of Drs  AC    Assessment:  Post operative day #2 s/p 1LTCS and BTL for arrest of descent, stable, and doing well    Plan:  1  Hemodynamically stable   - Pre-op Hb 11 4 --> post-op Hb 9 5   - Vitals WNL, currently asymptomatic  2  Continue routine post partum care   - Encourage ambulation   - Encourage breastfeeding  3  Continue current meds   - See list below   - Pain adequately controlled with PO analgesics  4  Disposition   - Stable   - patient would like early discharge today    Subjective/Objective     Chief Complaint:     Post operative day #1 from a 1LTCS with no complaints today    Subjective:     Pain: no  Tolerating Oral Intake: yes  Voiding: yes  Flatus: yes  Bowel Movement: no  Ambulating: yes  Breastfeeding: Breastfeeding  Chest Pain: no  Shortness of Breath: no  Leg Pain/Discomfort: no  Lochia: minimal    Objective:   Vitals: /88 (BP Location: Right arm)   Pulse 76   Temp 98 1 °F (36 7 °C) (Oral)   Resp 18   Ht 5' 10" (1 778 m)   Wt 90 3 kg (199 lb)   LMP 10/20/2017 (Exact Date)   SpO2 98%   Breastfeeding?  No   BMI 28 55 kg/m²       Intake/Output Summary (Last 24 hours) at 07/20/18 0639  Last data filed at 07/19/18 0901   Gross per 24 hour   Intake                0 ml   Output              550 ml   Net             -550 ml       Lab Results   Component Value Date    WBC 11 69 (H) 07/19/2018    HGB 9 5 (L) 07/19/2018    HCT 28 4 (L) 07/19/2018    MCV 93 07/19/2018     07/19/2018       Meds/Allergies   Current Facility-Administered Medications   Medication Dose Route Frequency    acetaminophen (TYLENOL) tablet 650 mg  650 mg Oral Q6H PRN    aluminum-magnesium hydroxide-simethicone (MYLANTA) 200-200-20 mg/5 mL oral suspension 15 mL  15 mL Oral Q6H PRN    benzocaine-menthol-lanolin-aloe (DERMOPLAST) 20-0 5 % topical spray 1 application  1 application Topical B2G PRN    calcium carbonate (TUMS) chewable tablet 1,000 mg  1,000 mg Oral Daily PRN    diphenhydrAMINE (BENADRYL) injection 25 mg  25 mg Intravenous Q6H PRN    docusate sodium (COLACE) capsule 100 mg  100 mg Oral BID    hydrocortisone 1 % cream 1 application  1 application Topical Daily PRN    ibuprofen (MOTRIN) tablet 600 mg  600 mg Oral Q6H PRN    ibuprofen (MOTRIN) tablet 600 mg  600 mg Oral Q6H PRN    ketorolac (TORADOL) injection 30 mg  30 mg Intravenous Q6H PRN    lactated ringers infusion  125 mL/hr Intravenous Continuous    ondansetron (ZOFRAN) injection 4 mg  4 mg Intravenous Q8H PRN    oxyCODONE-acetaminophen (PERCOCET) 5-325 mg per tablet 1 tablet  1 tablet Oral Q4H PRN    oxyCODONE-acetaminophen (PERCOCET) 5-325 mg per tablet 2 tablet  2 tablet Oral Q4H PRN    prenatal multivitamin tablet 1 tablet  1 tablet Oral Daily    simethicone (MYLICON) chewable tablet 80 mg  80 mg Oral 4x Daily PRN    witch hazel-glycerin (TUCKS) topical pad 1 pad  1 pad Topical Q4H PRN       Physical Exam:  General: in no apparent distress, well developed and well nourished and non-toxic  Cardiovascular: Cor RRR  Lungs: clear to auscultation bilaterally  Abdomen: abdomen is soft without significant tenderness, masses, organomegaly or guarding; incision c/d/i closed with running absorbable suture  Fundus: Firm and non-tender, 1 cm above the umbilicus  Lower extremeties: nontender, SCDs in place bilaterally    Labs/Tests:   No results found for this or any previous visit (from the past 24 hour(s))      MEDS:   Current Facility-Administered Medications   Medication Dose Route Frequency    acetaminophen (TYLENOL) tablet 650 mg  650 mg Oral Q6H PRN    aluminum-magnesium hydroxide-simethicone (MYLANTA) 200-200-20 mg/5 mL oral suspension 15 mL  15 mL Oral Q6H PRN    benzocaine-menthol-lanolin-aloe (DERMOPLAST) 20-0 5 % topical spray 1 application  1 application Topical M7T PRN    calcium carbonate (TUMS) chewable tablet 1,000 mg  1,000 mg Oral Daily PRN    diphenhydrAMINE (BENADRYL) injection 25 mg  25 mg Intravenous Q6H PRN    docusate sodium (COLACE) capsule 100 mg  100 mg Oral BID    hydrocortisone 1 % cream 1 application  1 application Topical Daily PRN    ibuprofen (MOTRIN) tablet 600 mg  600 mg Oral Q6H PRN    ibuprofen (MOTRIN) tablet 600 mg  600 mg Oral Q6H PRN    ketorolac (TORADOL) injection 30 mg  30 mg Intravenous Q6H PRN    lactated ringers infusion  125 mL/hr Intravenous Continuous    ondansetron (ZOFRAN) injection 4 mg  4 mg Intravenous Q8H PRN    oxyCODONE-acetaminophen (PERCOCET) 5-325 mg per tablet 1 tablet  1 tablet Oral Q4H PRN    oxyCODONE-acetaminophen (PERCOCET) 5-325 mg per tablet 2 tablet  2 tablet Oral Q4H PRN    prenatal multivitamin tablet 1 tablet  1 tablet Oral Daily    simethicone (MYLICON) chewable tablet 80 mg  80 mg Oral 4x Daily PRN    witch hazel-glycerin (TUCKS) topical pad 1 pad  1 pad Topical Q4H PRN     Invasive Devices          No matching active lines, drains, or airways          Tao Aguilar MD  PGY-2 OB/GYN   7/20/2018 6:39 AM

## 2018-07-20 NOTE — DISCHARGE INSTRUCTIONS
WHAT YOU NEED TO KNOW:   A , or  section, is abdominal surgery to deliver your baby  DISCHARGE INSTRUCTIONS:   Call 911 for any of the following:   · You feel lightheaded, short of breath, and have chest pain  · You cough up blood  Seek care immediately if:   · Blood soaks through your bandage  · Your stitches come apart  · Your arm or leg feels warm, tender, and painful  It may look swollen and red  Contact your OB if:   · You have heavy vaginal bleeding that fills 1 or more sanitary pads in 1 hour  · You have a fever  · Your incision is swollen, red, or draining pus  · You have questions or concerns about yourself or your baby  Medicines: You may  need any of the following:  · Prescription pain medicine  may be given  Ask how to take this medicine safely  · Acetaminophen  decreases pain and fever  It is available without a doctor's order  Ask how much to take and how often to take it  Follow directions  Acetaminophen can cause liver damage if not taken correctly  · NSAIDs , such as ibuprofen, help decrease swelling, pain, and fever  NSAIDs can cause stomach bleeding or kidney problems in certain people  If you take blood thinner medicine, always ask your healthcare provider if NSAIDs are safe for you  Always read the medicine label and follow directions  · Take your medicine as directed  Contact your healthcare provider if you think your medicine is not helping or if you have side effects  Tell him or her if you are allergic to any medicine  Keep a list of the medicines, vitamins, and herbs you take  Include the amounts, and when and why you take them  Bring the list or the pill bottles to follow-up visits  Carry your medicine list with you in case of an emergency  Wound care:  Carefully wash your wound with soap and water every day  Keep your wound clean and dry  Wear loose, comfortable clothes that do not rub against your wound   Ask your OB about bathing and showering  Limit activity as directed:   · Ask when it is safe for you to drive, walk up stairs, lift heavy objects, and have sex  · Ask when it is okay to exercise, and what types of exercise to do  Start slowly and do more as you get stronger  Drink liquids as directed:  Liquids help keep you hydrated after your procedure and decrease your risk for a blood clot  Ask how much liquid to drink each day and which liquids are best for you  Follow up with your OB as directed: You may need to return to have your stitches or staples removed  Write down your questions so you remember to ask them during your visits  © 2017 [address] Information is for End User's use only and may not be sold, redistributed or otherwise used for commercial purposes  All illustrations and images included in CareNotes® are the copyrighted property of A D A M , Inc  or [employer]  The above information is an  only  It is not intended as medical advice for individual conditions or treatments  Talk to your doctor, nurse or pharmacist before following any medical regimen to see if it is safe and effective for you   Section   WHAT YOU SHOULD KNOW:   A  delivery, or , is abdominal surgery to deliver your baby  There are many reasons you may need a   · A  may be scheduled before labor if you had a  with your last baby  It may be scheduled if your baby is not positioned normally, or you are pregnant with more than 1 baby  · Your caregiver may perform an emergency  during labor to prevent life-threatening complications for you or your baby  A  may be done if your cervix does not dilate after several hours of active labor  · Other reasons for a  include maternal infections and problems with the placenta  AFTER YOU LEAVE:   Medicines:   · Prescription pain medicine  may be given   Ask how to take this medicine safely  · Acetaminophen  decreases pain and fever  It is available without a doctor's order  Ask how much to take and how often to take it  Follow directions  Acetaminophen can cause liver damage if not taken correctly  · NSAIDs  help decrease swelling and pain or fever  This medicine is available with or without a doctor's order  NSAIDs can cause stomach bleeding or kidney problems in certain people  If you take blood thinner medicine, always ask your obstetrician if NSAIDs are safe for you  Always read the medicine label and follow directions  · Take your medicine as directed  Contact your obstetrician (OB) if you think your medicine is not helping or if you have side effects  Tell him if you are allergic to any medicine  Keep a list of the medicines, vitamins, and herbs you take  Include the amounts, and when and why you take them  Bring the list or the pill bottles to follow-up visits  Carry your medicine list with you in case of an emergency  Follow up with your OB as directed: You may need to return to have your stitches or staples removed  Write down your questions so you remember to ask them during your visits  Wound care:  Carefully wash your wound with soap and water every day  Keep your wound clean and dry  Wear loose, comfortable clothes that do not rub against your wound  Ask your OB about bathing and showering  Drink plenty of liquids: You can lower your risk for a blood clot if you drink plenty of liquids  Ask how much liquid to drink each day and which liquids are best for you  Limit activity until you have fully recovered from surgery:   · Ask when it is safe for you to drive, walk up stairs, lift heavy objects, and have sex  · Ask when it is okay to exercise, and what types of exercise to do  Start slowly and do more as you get stronger  Contact your OB if:   · You have heavy vaginal bleeding that fills 1 or more sanitary pads in 1 hour      · You have a fever      · Your incision is swollen, red, or draining pus  · You have questions or concerns about yourself or your baby  Seek care immediately or call 911 if:   · Blood soaks through your bandage  · Your stitches come apart  · You feel lightheaded, short of breath, and have chest pain  · You cough up blood  · Your arm or leg feels warm, tender, and painful  It may look swollen and red  © 2014 [address] is for End User's use only and may not be sold, redistributed or otherwise used for commercial purposes  All illustrations and images included in CareNotes® are the copyrighted property of A D A M , Inc  or [employer]  The above information is an  only  It is not intended as medical advice for individual conditions or treatments  Talk to your doctor, nurse or pharmacist before following any medical regimen to see if it is safe and effective for you

## 2018-07-23 ENCOUNTER — OFFICE VISIT (OUTPATIENT)
Dept: OBGYN CLINIC | Facility: MEDICAL CENTER | Age: 38
End: 2018-07-23

## 2018-07-23 VITALS
WEIGHT: 184.7 LBS | SYSTOLIC BLOOD PRESSURE: 120 MMHG | HEIGHT: 70 IN | DIASTOLIC BLOOD PRESSURE: 70 MMHG | BODY MASS INDEX: 26.44 KG/M2

## 2018-07-23 DIAGNOSIS — Z98.891 STATUS POST PRIMARY LOW TRANSVERSE CESAREAN SECTION: Primary | ICD-10-CM

## 2018-07-23 PROCEDURE — 99024 POSTOP FOLLOW-UP VISIT: CPT | Performed by: OBSTETRICS & GYNECOLOGY

## 2018-07-23 NOTE — PROGRESS NOTES
Assessment      Doing well postoperatively  Operative findings again reviewed  Plan     1  Continue any current medications  2  Wound care discussed  3  Activity restrictions: no gym class and no sports  4  Anticipated return to work: 8-12 weeks   5  Follow up: 5 weeks for incision check   Subjective     Clarita Escalante is a 41 y o  female who presents to the clinic 1 weeks status post  section   Eating a regular diet without difficulty  Bowel movements are normal  Pain is controlled with current analgesics  Medications being used: ibuprofen (OTC)  Review of Systems  Pertinent items are noted in HPI        Objective     /70   Ht 5' 10" (1 778 m)   Wt 83 8 kg (184 lb 11 2 oz)   LMP 10/20/2017 (Exact Date)   BMI 26 50 kg/m²   General:  alert and oriented, in no acute distress   Abdomen: soft, bowel sounds active, non-tender   Incision:   healing well, no drainage, no erythema, no hernia, no seroma, no swelling, no dehiscence, incision well approximated

## 2018-07-27 LAB — PLACENTA IN STORAGE: NORMAL

## 2018-08-30 ENCOUNTER — POSTPARTUM VISIT (OUTPATIENT)
Dept: OBGYN CLINIC | Facility: MEDICAL CENTER | Age: 38
End: 2018-08-30

## 2018-08-30 VITALS — SYSTOLIC BLOOD PRESSURE: 108 MMHG | DIASTOLIC BLOOD PRESSURE: 60 MMHG | BODY MASS INDEX: 22.67 KG/M2 | WEIGHT: 158 LBS

## 2018-08-30 PROCEDURE — 99024 POSTOP FOLLOW-UP VISIT: CPT | Performed by: OBSTETRICS & GYNECOLOGY

## 2018-08-30 NOTE — PROGRESS NOTES
OB POSTPARTUM VISIT PROGRESS NOTE  Date of Encounter: 2018    Maki Wisdom    : 1980  (41 y o )  MR: [id number]    Justina Gibbons is in for her postpartum visit  She is now   She delivered by primary  section and tubal ligation was performed  She delivered a Male on 18  Infant's name is Josefina Rosa  She's generally doing well, denies current pain or bleeding issues, and has no significant depression issues  She is bottle feeding  History of diabetes in pregnancy No  Complications in pregnancy: No   We discussed all appropriate contraceptive options and she chooses None  Objective     /60   Wt 71 7 kg (158 lb)   LMP 10/20/2017 (Exact Date)   BMI 22 67 kg/m²     General:   appears stated age, cooperative, alert normal mood and affect   Neck: Neck: normal, supple,trachea midline, no masses   Heart: regular rate and rhythm, S1, S2 normal, no murmur, click, rub or gallop   Lungs: clear to auscultation bilaterally   Incision: Intact, good healing    Vulva: normal female genitalia, Bartholin's, Urethra, Hazen normal, no lesions, normal female hair distribution   Vagina: normal vagina, no discharge, exudate, lesion, or erythema   Urethra: normal   Cervix: Normal, no discharge  Uterus: normal size, contour, position, consistency, mobility, non-tender   Adnexa: no mass, fullness, tenderness     Assessment/Plan   There are no diagnoses linked to this encounter    Pap and HPV negative 2018  Wilmer Rodriguez MD

## 2018-12-03 ENCOUNTER — OFFICE VISIT (OUTPATIENT)
Dept: URGENT CARE | Age: 38
End: 2018-12-03
Payer: COMMERCIAL

## 2018-12-03 VITALS
SYSTOLIC BLOOD PRESSURE: 110 MMHG | OXYGEN SATURATION: 100 % | HEART RATE: 63 BPM | RESPIRATION RATE: 20 BRPM | DIASTOLIC BLOOD PRESSURE: 78 MMHG | WEIGHT: 158.6 LBS | HEIGHT: 70 IN | TEMPERATURE: 99.3 F | BODY MASS INDEX: 22.71 KG/M2

## 2018-12-03 DIAGNOSIS — L03.032 CELLULITIS OF TOE OF LEFT FOOT: Primary | ICD-10-CM

## 2018-12-03 PROCEDURE — 99213 OFFICE O/P EST LOW 20 MIN: CPT | Performed by: PHYSICIAN ASSISTANT

## 2018-12-03 RX ORDER — CEPHALEXIN 500 MG/1
500 CAPSULE ORAL EVERY 8 HOURS SCHEDULED
Qty: 21 CAPSULE | Refills: 0 | Status: SHIPPED | OUTPATIENT
Start: 2018-12-03 | End: 2018-12-10

## 2018-12-03 NOTE — PROGRESS NOTES
[facility]        NAME: Clifton Ratliff is a 41 y o  female  : 1980    MRN: [id number]  DATE: December 3, 2018  TIME: 3:29 PM    Assessment and Plan   Cellulitis of toe of left foot [L03 032]  1  Cellulitis of toe of left foot  cephalexin (KEFLEX) 500 mg capsule         Patient Instructions       Take medications as directed  Drink plenty of fluids  Follow up with family doctor this week  Follow up with Podiatry if symptoms return  Call Forrest Gunn to schedule an appointment: [phone]  Go to ER if new or worsening symptoms occur  Chief Complaint     Chief Complaint   Patient presents with    Toe Pain     Pt reports 2-3 week hx of pain, redness, swelling and drainage in her left great toe  Denies injury/fall  Soaked in [employer] salt and appied Neopsorin  Feels symptoms have worsened  History of Present Illness       Rash   This is a new problem  Episode onset: Began 2-3 weeks ago  In the past week significantly worsened  The problem has been gradually worsening since onset  Location: Medial aspect of left great toe  Rash characteristics: Red, tender, warm  She was exposed to nothing  Pertinent negatives include no diarrhea, fatigue, fever, shortness of breath or vomiting  Treatments tried: Epsom salt soaks  The treatment provided mild relief  Review of Systems   Review of Systems   Constitutional: Negative  Negative for chills, fatigue and fever  HENT: Negative  Eyes: Negative  Respiratory: Negative  Negative for chest tightness, shortness of breath and stridor  Cardiovascular: Negative  Negative for chest pain and palpitations  Gastrointestinal: Negative for abdominal pain, constipation, diarrhea, nausea and vomiting  Endocrine: Negative  Genitourinary: Negative for dysuria, flank pain, frequency, pelvic pain, vaginal discharge and vaginal pain  Musculoskeletal: Negative for back pain, neck pain and neck stiffness     Skin: Positive for rash    Allergic/Immunologic: Negative  Neurological: Negative  Negative for weakness and numbness  Hematological: Negative  Psychiatric/Behavioral: Negative  Current Medications       Current Outpatient Prescriptions:     cephalexin (KEFLEX) 500 mg capsule, Take 1 capsule (500 mg total) by mouth every 8 (eight) hours for 7 days, Disp: 21 capsule, Rfl: 0    Current Allergies     Allergies as of 2018    (No Known Allergies)            The following portions of the patient's history were reviewed and updated as appropriate: allergies, current medications, past family history, past medical history, past social history, past surgical history and problem list      Past Medical History:   Diagnosis Date    Asthma     as child       Past Surgical History:   Procedure Laterality Date    DC  DELIVERY ONLY N/A 2018    Procedure:  SECTION (); Surgeon: Ailsa Piña MD;  Location: [facility];  Service: Obstetrics    TONSILLECTOMY      18 y o    TUBAL LIGATION N/A 2018    Procedure: LIGATION/COAGULATION TUBAL;  Surgeon: Alisa Piña MD;  Location: [facility];  Service: Obstetrics    WISDOM TOOTH EXTRACTION      18 y o       Family History   Problem Relation Age of Onset    No Known Problems Mother     Stroke Father     Dementia Father     No Known Problems Sister          Medications have been verified  Objective   /78   Pulse 63   Temp 99 3 °F (37 4 °C)   Resp 20   Ht 5' 10" (1 778 m)   Wt 71 9 kg (158 lb 9 6 oz)   SpO2 100%   Breastfeeding? No   BMI 22 76 kg/m²        Physical Exam     Physical Exam   Constitutional: She appears well-developed and well-nourished  No distress  HENT:   Head: Normocephalic and atraumatic  Cardiovascular: Normal rate, regular rhythm, normal heart sounds and intact distal pulses  Pulmonary/Chest: Effort normal and breath sounds normal  No respiratory distress  She has no wheezes  She has no rales  Musculoskeletal:        Feet:    Skin: She is not diaphoretic  Nursing note and vitals reviewed

## 2018-12-03 NOTE — PATIENT INSTRUCTIONS
Take medications as directed  Drink plenty of fluids  Follow up with family doctor this week  Follow up with Podiatry if symptoms return  Call [place] to schedule an appointment: [phone]  Go to ER if new or worsening symptoms occur  Cellulitis   WHAT YOU NEED TO KNOW:   Cellulitis is a skin infection caused by bacteria  Cellulitis may go away on its own or you may need treatment  Your healthcare provider may draw a [place] around the outside edges of your cellulitis  If your cellulitis spreads, your healthcare provider will see it outside of the [place]  DISCHARGE INSTRUCTIONS:   Call 911 if:   · You have sudden trouble breathing or chest pain  Return to the emergency department if:   · Your wound gets larger and more painful  · You feel a crackling under your skin when you touch it  · You have purple dots or bumps on your skin, or you see bleeding under your skin  · You have new swelling and pain in your legs  · The red, warm, swollen area gets larger  · You see red streaks coming from the infected area  Contact your healthcare provider if:   · You have a fever  · Your fever or pain does not go away or gets worse  · The area does not get smaller after 2 days of antibiotics  · Your skin is flaking or peeling off  · You have questions or concerns about your condition or care  Medicines:   · Antibiotics  help treat the bacterial infection  · NSAIDs , such as ibuprofen, help decrease swelling, pain, and fever  NSAIDs can cause stomach bleeding or kidney problems in certain people  If you take blood thinner medicine, always ask if NSAIDs are safe for you  Always read the medicine label and follow directions  Do not give these medicines to children under 10months of age without direction from your child's healthcare provider  · Acetaminophen  decreases pain and fever  It is available without a doctor's order   Ask how much to take and how often to take it  Follow directions  Read the labels of all other medicines you are using to see if they also contain acetaminophen, or ask your doctor or pharmacist  Acetaminophen can cause liver damage if not taken correctly  Do not use more than 4 grams (4,000 milligrams) total of acetaminophen in one day  · Take your medicine as directed  Contact your healthcare provider if you think your medicine is not helping or if you have side effects  Tell him or her if you are allergic to any medicine  Keep a list of the medicines, vitamins, and herbs you take  Include the amounts, and when and why you take them  Bring the list or the pill bottles to follow-up visits  Carry your medicine list with you in case of an emergency  Self-care:   · Elevate the area above the level of your heart  as often as you can  This will help decrease swelling and pain  Prop the area on pillows or blankets to keep it elevated comfortably  · Clean the area daily until the wound scabs over  Gently wash the area with soap and water  Pat dry  Use dressings as directed  · Place cool or warm, wet cloths on the area as directed  Use clean cloths and clean water  Leave it on the area until the cloth is room temperature  Pat the area dry with a clean, dry cloth  The cloths may help decrease pain  Prevent cellulitis:   · Do not scratch bug bites or areas of injury  You increase your risk for cellulitis by scratching these areas  · Do not share personal items, such as towels, clothing, and razors  · Clean exercise equipment  with germ-killing  before and after you use it  · Wash your hands often  Use soap and water  Wash your hands after you use the bathroom, change a child's diapers, or sneeze  Wash your hands before you prepare or eat food  Use lotion to prevent dry, cracked skin  · Wear pressure stockings as directed  You may be told to wear the stockings if you have peripheral edema   The stockings improve blood flow and decrease swelling  · Treat athlete's foot  This can help prevent the spread of a bacterial skin infection  Follow up with your healthcare provider within 3 days, or as directed: Your healthcare provider will check if your cellulitis is getting better  You may need different medicine  Write down your questions so you remember to ask them during your visits  © 2017 [employer] Information is for End User's use only and may not be sold, redistributed or otherwise used for commercial purposes  All illustrations and images included in CareNotes® are the copyrighted property of [employer]  or [employer]  The above information is an  only  It is not intended as medical advice for individual conditions or treatments  Talk to your doctor, nurse or pharmacist before following any medical regimen to see if it is safe and effective for you

## 2020-11-14 ENCOUNTER — APPOINTMENT (OUTPATIENT)
Dept: RADIOLOGY | Age: 40
End: 2020-11-14
Payer: COMMERCIAL

## 2020-11-14 ENCOUNTER — TRANSCRIBE ORDERS (OUTPATIENT)
Dept: URGENT CARE | Age: 40
End: 2020-11-14

## 2020-11-14 ENCOUNTER — OFFICE VISIT (OUTPATIENT)
Dept: URGENT CARE | Age: 40
End: 2020-11-14
Payer: COMMERCIAL

## 2020-11-14 VITALS — HEIGHT: 70 IN | BODY MASS INDEX: 24.34 KG/M2 | WEIGHT: 170 LBS

## 2020-11-14 DIAGNOSIS — M79.609 PAIN IN EXTREMITY, UNSPECIFIED EXTREMITY: ICD-10-CM

## 2020-11-14 DIAGNOSIS — M79.609 PAIN IN EXTREMITY, UNSPECIFIED EXTREMITY: Primary | ICD-10-CM

## 2020-11-14 DIAGNOSIS — S82.891A CLOSED AVULSION FRACTURE OF RIGHT ANKLE, INITIAL ENCOUNTER: ICD-10-CM

## 2020-11-14 DIAGNOSIS — S93.401A SPRAIN OF RIGHT ANKLE, UNSPECIFIED LIGAMENT, INITIAL ENCOUNTER: Primary | ICD-10-CM

## 2020-11-14 PROCEDURE — 73610 X-RAY EXAM OF ANKLE: CPT

## 2020-11-14 PROCEDURE — 99213 OFFICE O/P EST LOW 20 MIN: CPT | Performed by: PHYSICIAN ASSISTANT

## 2021-05-24 ENCOUNTER — OFFICE VISIT (OUTPATIENT)
Dept: OBGYN CLINIC | Facility: MEDICAL CENTER | Age: 41
End: 2021-05-24
Payer: COMMERCIAL

## 2021-05-24 VITALS
BODY MASS INDEX: 26.2 KG/M2 | WEIGHT: 183 LBS | SYSTOLIC BLOOD PRESSURE: 120 MMHG | HEIGHT: 70 IN | DIASTOLIC BLOOD PRESSURE: 72 MMHG

## 2021-05-24 DIAGNOSIS — Z11.3 SCREENING EXAMINATION FOR STD (SEXUALLY TRANSMITTED DISEASE): Primary | ICD-10-CM

## 2021-05-24 PROCEDURE — 87491 CHLMYD TRACH DNA AMP PROBE: CPT | Performed by: STUDENT IN AN ORGANIZED HEALTH CARE EDUCATION/TRAINING PROGRAM

## 2021-05-24 PROCEDURE — 99212 OFFICE O/P EST SF 10 MIN: CPT | Performed by: STUDENT IN AN ORGANIZED HEALTH CARE EDUCATION/TRAINING PROGRAM

## 2021-05-24 PROCEDURE — 87591 N.GONORRHOEAE DNA AMP PROB: CPT | Performed by: STUDENT IN AN ORGANIZED HEALTH CARE EDUCATION/TRAINING PROGRAM

## 2021-05-24 NOTE — PROGRESS NOTES
[facility] [address] [place] [address]    Assessment/Plan:  You Campos is a 46 y o   who presents desiring STI screening after nonmonogamy in her partner without known specific exposure  No problem-specific Assessment & Plan notes found for this encounter  Diagnoses and all orders for this visit:    Screening examination for STD (sexually transmitted disease)  -     HIV 1/2 ANTIGEN/ANTIBODY (4TH GENERATION) W REFLEX [facility]; Future  -     RPR; Future  -     Hepatitis B surface antigen; Future  -     Chlamydia/GC amplified DNA by PCR          Subjective:   You Campos is a 46 y o   female  CC: STI testing    HPI: You Campos is a 46 y o   who presents desiring STI screening after nonmonogamy in her partner without known specific exposure  She desires full STI testing  She has no specific symptoms of vaginal discharge, burning, irritation, or pain  ROS: Review of Systems   Constitutional: Negative for chills and fever  Respiratory: Negative for cough and shortness of breath  Cardiovascular: Negative for chest pain and leg swelling  Gastrointestinal: Negative for abdominal pain, nausea and vomiting  Genitourinary: Negative for dysuria, frequency and urgency  Neurological: Negative for dizziness, light-headedness and headaches  PFSH: The following portions of the patient's history were reviewed and updated as appropriate: allergies, current medications, past family history, past medical history, obstetric history, gynecologic history, past social history, past surgical history and problem list        Objective:  /72   Ht 5' 10" (1 778 m)   Wt 83 kg (183 lb)   LMP 2021 (Approximate)   BMI 26 26 kg/m²    Physical Exam  Constitutional:       Appearance: Normal appearance  Cardiovascular:      Rate and Rhythm: Normal rate     Pulmonary:      Effort: Pulmonary effort is normal    Neurological:      Mental Status: She is alert     Psychiatric:         Mood and Affect: Mood normal          Behavior: Behavior normal            Margot Patterson MD  [address] [place] [address]  5/24/2021 6:29 PM

## 2021-05-26 ENCOUNTER — APPOINTMENT (OUTPATIENT)
Dept: LAB | Facility: MEDICAL CENTER | Age: 41
End: 2021-05-26
Payer: COMMERCIAL

## 2021-05-26 DIAGNOSIS — Z11.3 SCREENING EXAMINATION FOR STD (SEXUALLY TRANSMITTED DISEASE): ICD-10-CM

## 2021-05-26 LAB
C TRACH DNA SPEC QL NAA+PROBE: NEGATIVE
HBV SURFACE AG SER QL: NORMAL
N GONORRHOEA DNA SPEC QL NAA+PROBE: NEGATIVE

## 2021-05-26 PROCEDURE — 86592 SYPHILIS TEST NON-TREP QUAL: CPT

## 2021-05-26 PROCEDURE — 36415 COLL VENOUS BLD VENIPUNCTURE: CPT

## 2021-05-26 PROCEDURE — 87389 HIV-1 AG W/HIV-1&-2 AB AG IA: CPT

## 2021-05-26 PROCEDURE — 87340 HEPATITIS B SURFACE AG IA: CPT

## 2021-05-27 LAB
HIV 1+2 AB+HIV1 P24 AG SERPL QL IA: NORMAL
RPR SER QL: NORMAL

## 2023-12-17 ENCOUNTER — OFFICE VISIT (OUTPATIENT)
Dept: URGENT CARE | Age: 43
End: 2023-12-17
Payer: COMMERCIAL

## 2023-12-17 VITALS
HEART RATE: 79 BPM | WEIGHT: 180 LBS | RESPIRATION RATE: 18 BRPM | SYSTOLIC BLOOD PRESSURE: 121 MMHG | BODY MASS INDEX: 25.77 KG/M2 | HEIGHT: 70 IN | OXYGEN SATURATION: 97 % | DIASTOLIC BLOOD PRESSURE: 72 MMHG | TEMPERATURE: 98.1 F

## 2023-12-17 DIAGNOSIS — J06.9 VIRAL URI: Primary | ICD-10-CM

## 2023-12-17 PROCEDURE — 99213 OFFICE O/P EST LOW 20 MIN: CPT | Performed by: NURSE PRACTITIONER

## 2023-12-17 RX ORDER — PREDNISONE 20 MG/1
20 TABLET ORAL 2 TIMES DAILY WITH MEALS
Qty: 10 TABLET | Refills: 0 | Status: SHIPPED | OUTPATIENT
Start: 2023-12-17 | End: 2023-12-22

## 2023-12-17 NOTE — PROGRESS NOTES
"  [facility]        NAME: Lindsay Holder is a 43 y.o. female  : 1980    MRN: [id number]  DATE: 2023  TIME: 1:57 PM    Assessment and Plan   Viral URI [J06.9]  1. Viral URI  predniSONE 20 mg tablet        Recommend aleve cold and sinus for symptom management   Prednisone as directed  F/u with pcp    Patient Instructions     Follow up with PCP in 3-5 days.  Proceed to  ER if symptoms worsen.    Chief Complaint     Chief Complaint   Patient presents with    Cough     Cough, sore throat, nasal congestion x 5 days         History of Present Illness   Lindsay Holder presents to the clinic c/o    Cough (Cough, sore throat, nasal congestion x 5 days)  Symptoms started on Tuesday of this week - taking dayquil with minimal to no relief of symptoms.        Review of Systems   Review of Systems   All other systems reviewed and are negative.        Current Medications     No long-term medications on file.       Current Allergies     Allergies as of 2023    (No Known Allergies)            The following portions of the patient's history were reviewed and updated as appropriate: allergies, current medications, past family history, past medical history, past social history, past surgical history and problem list.    Objective   /72   Pulse 79   Temp 98.1 °F (36.7 °C)   Resp 18   Ht 5' 10\" (1.778 m)   Wt 81.6 kg (180 lb)   SpO2 97%   BMI 25.83 kg/m²        Physical Exam     Physical Exam  Vitals and nursing note reviewed.   Constitutional:       Appearance: Normal appearance. She is well-developed.   HENT:      Head: Normocephalic and atraumatic.      Right Ear: Hearing, tympanic membrane, ear canal and external ear normal.      Left Ear: Hearing, tympanic membrane, ear canal and external ear normal.      Nose: Mucosal edema and congestion present.      Right Sinus: No maxillary sinus tenderness or frontal sinus tenderness.      Left Sinus: No maxillary sinus tenderness or frontal " sinus tenderness.      Mouth/Throat:      Lips: Pink.      Mouth: Mucous membranes are moist.      Pharynx: Oropharynx is clear.   Eyes:      General: Lids are normal.      Conjunctiva/sclera: Conjunctivae normal.      Pupils: Pupils are equal, round, and reactive to light.   Cardiovascular:      Rate and Rhythm: Normal rate and regular rhythm.      Heart sounds: Normal heart sounds, S1 normal and S2 normal.   Pulmonary:      Effort: Pulmonary effort is normal.      Breath sounds: Normal breath sounds.   Abdominal:      General: Abdomen is flat. Bowel sounds are normal.      Palpations: Abdomen is soft.   Musculoskeletal:         General: Normal range of motion.      Cervical back: Full passive range of motion without pain, normal range of motion and neck supple.   Lymphadenopathy:      Cervical: No cervical adenopathy.   Skin:     General: Skin is warm and dry.   Neurological:      General: No focal deficit present.      Mental Status: She is alert and oriented to person, place, and time.   Psychiatric:         Mood and Affect: Mood normal.         Speech: Speech normal.         Behavior: Behavior normal. Behavior is cooperative.         Thought Content: Thought content normal.         Judgment: Judgment normal.

## 2024-04-23 ENCOUNTER — OFFICE VISIT (OUTPATIENT)
Dept: URGENT CARE | Age: 44
End: 2024-04-23
Payer: COMMERCIAL

## 2024-04-23 ENCOUNTER — APPOINTMENT (OUTPATIENT)
Dept: RADIOLOGY | Age: 44
End: 2024-04-23
Payer: COMMERCIAL

## 2024-04-23 VITALS
TEMPERATURE: 98.7 F | OXYGEN SATURATION: 99 % | RESPIRATION RATE: 20 BRPM | WEIGHT: 208 LBS | HEIGHT: 70 IN | HEART RATE: 65 BPM | BODY MASS INDEX: 29.78 KG/M2 | SYSTOLIC BLOOD PRESSURE: 129 MMHG | DIASTOLIC BLOOD PRESSURE: 78 MMHG

## 2024-04-23 DIAGNOSIS — R07.81 RIB PAIN: ICD-10-CM

## 2024-04-23 DIAGNOSIS — R07.81 RIB PAIN: Primary | ICD-10-CM

## 2024-04-23 PROCEDURE — 71101 X-RAY EXAM UNILAT RIBS/CHEST: CPT

## 2024-04-23 PROCEDURE — 99213 OFFICE O/P EST LOW 20 MIN: CPT

## 2024-04-23 NOTE — PATIENT INSTRUCTIONS
Motrin or tylenol for pain  Salon pas to the affected area.  Ice for the first few days followed by heat to the area.    Follow up with PCP
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
## 2024-04-24 ENCOUNTER — TELEPHONE (OUTPATIENT)
Dept: URGENT CARE | Age: 44
End: 2024-04-24

## 2024-12-02 ENCOUNTER — OFFICE VISIT (OUTPATIENT)
Dept: URGENT CARE | Age: 44
End: 2024-12-02
Payer: COMMERCIAL

## 2024-12-02 ENCOUNTER — APPOINTMENT (OUTPATIENT)
Dept: RADIOLOGY | Age: 44
End: 2024-12-02
Payer: COMMERCIAL

## 2024-12-02 VITALS
BODY MASS INDEX: 28.35 KG/M2 | HEIGHT: 70 IN | TEMPERATURE: 98 F | RESPIRATION RATE: 18 BRPM | OXYGEN SATURATION: 99 % | DIASTOLIC BLOOD PRESSURE: 76 MMHG | WEIGHT: 198 LBS | SYSTOLIC BLOOD PRESSURE: 120 MMHG

## 2024-12-02 DIAGNOSIS — M25.562 ACUTE PAIN OF BOTH KNEES: ICD-10-CM

## 2024-12-02 DIAGNOSIS — M25.562 ACUTE PAIN OF BOTH KNEES: Primary | ICD-10-CM

## 2024-12-02 DIAGNOSIS — M25.561 ACUTE PAIN OF BOTH KNEES: Primary | ICD-10-CM

## 2024-12-02 DIAGNOSIS — M25.561 ACUTE PAIN OF BOTH KNEES: ICD-10-CM

## 2024-12-02 PROCEDURE — 73562 X-RAY EXAM OF KNEE 3: CPT

## 2024-12-02 PROCEDURE — 99213 OFFICE O/P EST LOW 20 MIN: CPT | Performed by: EMERGENCY MEDICINE

## 2024-12-02 RX ORDER — METHYLPREDNISOLONE 4 MG/1
TABLET ORAL
Qty: 21 TABLET | Refills: 0 | Status: SHIPPED | OUTPATIENT
Start: 2024-12-02

## 2024-12-02 NOTE — PROGRESS NOTES
[facility]        NAME: Lindsay Holder is a 44 y.o. female  : 1980    MRN: [id number]  DATE: 2024  TIME: 6:01 PM    Assessment and Plan   Acute pain of both knees [M25.561, M25.562]  1. Acute pain of both knees  Ambulatory Referral to Orthopedic Surgery    XR knee 3 vw left non injury    XR knee 3 vw right non injury    methylPREDNISolone 4 MG tablet therapy pack        Bilateral knee x-rays reviewed: Bilateral knee osteoarthritis noted.  No acute osseous abnormalities noted.  Pending radiology final read.    Patient Instructions     Start Medrol Dosepak as prescribed.  Do not take any NSAIDs while taking this medication.  May try over-the-counter diclofenac cream as discussed  Tylenol as needed  Ice 20 minutes 3-4 times per day  Insulate the skin from the ice to prevent frostbite  Rest and Elevate  Follow up with orthopedic if symptoms do not improve  Follow up with PCP in 3-5 days.  Proceed to ER if symptoms worsen.    If tests are performed, our office will contact you with results only if changes need to made to the care plan discussed with you at the visit. You can review your full results on [facility].    Chief Complaint     Chief Complaint   Patient presents with    Knee Pain     X2week, left hurts more then right knee, left knee swelling, 7/10 pain         History of Present Illness       Patient is a 43 yo female with no significant PMH presenting in the clinic today for knee pain x 1 month. Denies recent injuries, trauma, and/or falls.Patient locates their pain to the medial aspect of b/l knee. She describes her pain as a stiffness. She rates her pain 7/10. Admits pain is exacerbated with standing and knee extension. Denies fever, chills, numbness, tingling, decreased ROM, swelling, bruising, erythema, warmth, chest pain, and SOB. Admits the use of Aleve, ice therapy, and compression braces for symptom management. Denies prior similar injuries.        Review of  "Systems   Review of Systems   Constitutional:  Negative for chills and fever.   Respiratory:  Negative for shortness of breath.    Cardiovascular:  Negative for chest pain.   Musculoskeletal:  Positive for arthralgias. Negative for joint swelling.   Skin:  Negative for rash and wound.   Neurological:  Negative for numbness.         Current Medications       Current Outpatient Medications:     methylPREDNISolone 4 MG tablet therapy pack, Use as directed on package, Disp: 21 tablet, Rfl: 0    Naproxen Sodium (ALEVE PO), Take by mouth, Disp: , Rfl:     Current Allergies     Allergies as of 2024    (No Known Allergies)            The following portions of the patient's history were reviewed and updated as appropriate: allergies, current medications, past family history, past medical history, past social history, past surgical history and problem list.     Past Medical History:   Diagnosis Date    Asthma     as child       Past Surgical History:   Procedure Laterality Date    VT  DELIVERY ONLY N/A 2018    Procedure:  SECTION ();  Surgeon: Malina Garza MD;  Location: [facility];  Service: Obstetrics    TONSILLECTOMY      19 y.o    TUBAL LIGATION N/A 2018    Procedure: LIGATION/COAGULATION TUBAL;  Surgeon: Malina Garza MD;  Location: [facility];  Service: Obstetrics    WISDOM TOOTH EXTRACTION      19 y.o       Family History   Problem Relation Age of Onset    No Known Problems Mother     Stroke Father     Dementia Father     No Known Problems Sister          Medications have been verified.        Objective   /76 (Patient Position: Sitting, Cuff Size: Large)   Temp 98 °F (36.7 °C)   Resp 18   Ht 5' 10\" (1.778 m)   Wt 89.8 kg (198 lb)   SpO2 99%   BMI 28.41 kg/m²        Physical Exam     Physical Exam  Vitals reviewed.   Constitutional:       General: She is not in acute distress.     Appearance: Normal appearance. She is normal weight. She is not ill-appearing.   HENT:      " Head: Normocephalic.      Nose: Nose normal.      Mouth/Throat:      Mouth: Mucous membranes are moist.   Eyes:      Conjunctiva/sclera: Conjunctivae normal.   Cardiovascular:      Rate and Rhythm: Normal rate and regular rhythm.      Pulses: Normal pulses.      Heart sounds: Normal heart sounds. No murmur heard.     No friction rub. No gallop.   Pulmonary:      Effort: Pulmonary effort is normal.      Breath sounds: Normal breath sounds. No wheezing, rhonchi or rales.   Musculoskeletal:      Cervical back: Normal range of motion and neck supple.      Right upper leg: Normal.      Left upper leg: Normal.      Right knee: Crepitus present. No swelling, effusion, erythema or bony tenderness. Decreased range of motion (With knee extension secondary to pain). Tenderness present over the medial joint line. No lateral joint line or patellar tendon tenderness. Normal pulse.      Left knee: Crepitus present. No swelling, effusion, erythema or bony tenderness. Decreased range of motion (With knee extension secondary to pain). Tenderness present over the medial joint line. No lateral joint line or patellar tendon tenderness. Normal pulse.      Right lower leg: Normal.      Left lower leg: Normal.   Skin:     General: Skin is warm.      Findings: No rash.   Neurological:      Mental Status: She is alert.   Psychiatric:         Mood and Affect: Mood normal.         Behavior: Behavior normal.

## 2024-12-02 NOTE — PATIENT INSTRUCTIONS
Start Medrol Dosepak as prescribed.  Do not take any NSAIDs while taking this medication.  May try over-the-counter diclofenac cream as discussed  Tylenol as needed  Ice 20 minutes 3-4 times per day  Insulate the skin from the ice to prevent frostbite  Rest and Elevate  Follow up with orthopedic if symptoms do not improve  Follow up with PCP in 3-5 days.  Proceed to ER if symptoms worsen.

## 2024-12-04 RX ORDER — METHYLPREDNISOLONE 4 MG/1
TABLET ORAL
Qty: 21 TABLET | Refills: 0 | Status: SHIPPED | OUTPATIENT
Start: 2024-12-04

## 2024-12-09 ENCOUNTER — OFFICE VISIT (OUTPATIENT)
Dept: OBGYN CLINIC | Facility: CLINIC | Age: 44
End: 2024-12-09
Payer: COMMERCIAL

## 2024-12-09 VITALS
DIASTOLIC BLOOD PRESSURE: 77 MMHG | HEIGHT: 70 IN | HEART RATE: 76 BPM | BODY MASS INDEX: 28.35 KG/M2 | SYSTOLIC BLOOD PRESSURE: 111 MMHG | WEIGHT: 198 LBS

## 2024-12-09 DIAGNOSIS — M17.0 BILATERAL PRIMARY OSTEOARTHRITIS OF KNEE: Primary | ICD-10-CM

## 2024-12-09 DIAGNOSIS — M25.561 ACUTE PAIN OF BOTH KNEES: ICD-10-CM

## 2024-12-09 DIAGNOSIS — M25.562 ACUTE PAIN OF BOTH KNEES: ICD-10-CM

## 2024-12-09 PROCEDURE — 99204 OFFICE O/P NEW MOD 45 MIN: CPT | Performed by: STUDENT IN AN ORGANIZED HEALTH CARE EDUCATION/TRAINING PROGRAM

## 2024-12-09 RX ORDER — MELOXICAM 15 MG/1
15 TABLET ORAL DAILY
Qty: 30 TABLET | Refills: 3 | Status: SHIPPED | OUTPATIENT
Start: 2024-12-09

## 2024-12-09 NOTE — PROGRESS NOTES
Orthopaedics Office Visit - new Patient Visit    ASSESSMENT/PLAN:    Assessment:   Bilateral knee osteoarthritis    Plan:   X-rays reviewed and discussed with patient revealing bilateral medial compartment osteoarthritis with mild to moderate joint space narrowing medially.  The lateral joint spaces are well-maintained.  No acute fractures or dislocations.  No osseous lesions.  Pt to be weightbearing as tolerated to bilateral lower extremity  ROM as tolerated to bilateral lower extremity  Discussed with patient nonoperative management with rest, ice, physical therapy, over-the-counter anti-inflammatories, Voltaren and corticosteroid injection versus operative intervention with total knee arthroplasty  Patient would like to proceed with conservative management at this time with meloxicam 15 mg p.o. once daily taken with food.  Discussed with the patient that if this is not successful in relieving her pain then she can return for corticosteroid injections.  I did discuss with the patient that her age she should limit how many corticosteroid injection she gets to the knees to avoid accumulation of steroids and degradation of cartilage  Pt to continue at home analgesic regimen with Tylenol   Pt to follow up as needed        _____________________________________________________  CHIEF COMPLAINT:  Chief Complaint   Patient presents with    Left Knee - Pain    Right Knee - Pain         SUBJECTIVE:  Lindsay Holder is a 44 y.o. female who presents with bilateral knee pain. She states most pain is concentrated in the medial side bilaterally.  The right knee pain is typically worse than the left knee pain.  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  She started a Medrol Dosepak recently however did not completed as she lost the Medrol Dosepak and was unable to obtain another 1.  She is currently taking Aleve for pain relief as well as Tylenol with  "minimal relief.  She complains of stiffness associated after periods of inactivity and throbbing after periods of ambulation.  She denies any clicking catching or locking.  She denies any traumatic injuries.. Pain is made worse with ambulation weightbearing, and relieved with rest. She denies any other numbness or paresthesias.     PAST MEDICAL HISTORY:  Past Medical History:   Diagnosis Date    Asthma     as child       PAST SURGICAL HISTORY:  Past Surgical History:   Procedure Laterality Date    AK  DELIVERY ONLY N/A 2018    Procedure:  SECTION ();  Surgeon: Malina Garza MD;  Location: [facility];  Service: Obstetrics    TONSILLECTOMY      19 y.o    TUBAL LIGATION N/A 2018    Procedure: LIGATION/COAGULATION TUBAL;  Surgeon: Malina Garza MD;  Location: [facility];  Service: Obstetrics    WISDOM TOOTH EXTRACTION      19 y.o       FAMILY HISTORY:  Family History   Problem Relation Age of Onset    No Known Problems Mother     Stroke Father     Dementia Father     No Known Problems Sister        SOCIAL HISTORY:  Social History     Tobacco Use    Smoking status: Never    Smokeless tobacco: Never   Vaping Use    Vaping status: Never Used   Substance Use Topics    Alcohol use: No    Drug use: No       MEDICATIONS:    Current Outpatient Medications:     methylPREDNISolone 4 MG tablet therapy pack, Use as directed on package, Disp: 21 tablet, Rfl: 0    methylPREDNISolone 4 MG tablet therapy pack, Use as directed on package, Disp: 21 tablet, Rfl: 0    Naproxen Sodium (ALEVE PO), Take by mouth, Disp: , Rfl:     ALLERGIES:  No Known Allergies    REVIEW OF SYSTEMS:  MSK: Bilateral knee pain  Neuro: None  Pertinent items are otherwise noted in HPI.  A comprehensive review of systems was otherwise negative.    LABS:  HgA1c: No results found for: \"HGBA1C\"  BMP:   Lab Results   Component Value Date    GLUCOSE 127 2018    GLUCOSE 141 2018     CBC: No components found for: " "\"CBC\"    _____________________________________________________  PHYSICAL EXAMINATION:  Vital signs: /77   Pulse 76   Ht 5' 10\" (1.778 m)   Wt 89.8 kg (198 lb)   BMI 28.41 kg/m²   General: No acute distress, awake and alert  Psychiatric: Mood and affect appear appropriate  HEENT: Trachea Midline, No torticollis, no apparent facial trauma  Cardiovascular: No audible murmurs; Extremities appear perfused  Pulmonary: No audible wheezing or stridor  Skin: No open lesions; see further details (if any) below    MUSCULOSKELETAL EXAMINATION:  Extremities: Left lower extremity  The left lower extremity was exposed and inspected. Visible skin intact without erythema, ecchymosis, effusion or obvious osseous deformity. TTP over the medial joint line which reproduces her pain.  No tenderness to palpation over the extensor mechanism or lateral joint lines.. Pt able to range from 0 220 degrees of knee flexion.  Knee stable to valgus/varus stress.  Negative anterior/posterior drawer testing.  Sensation intact to superficial peroneal, deep peroneal, sural, saphenous, plantar nerve distributions. Motor intact to extensor hallux longus, tibialis anterior, gastrocnemius muscles, extensor mechanism intact. Limb is well perfused. Brisk capillary refill in all 5 digits. Compartments soft and compressible.     Right lower extremity  The right lower extremity was exposed and inspected. Visible skin intact without erythema, ecchymosis, effusion or obvious osseous deformity. TTP over the medial joint line.  No tenderness to palpation over the extensor mechanism or lateral joint line.. Pt able to range from 0-120 degrees of knee flexion.  Knee stable to valgus/varus stress.  Negative anterior/posterior drawer testing.  Sensation intact to superficial peroneal, deep peroneal, sural, saphenous, plantar nerve distributions. Motor intact to extensor hallux longus, tibialis anterior, gastrocnemius muscles, extensor mechanism intact. Limb is " well perfused. Brisk capillary refill in all 5 digits. Compartments soft and compressible.       _____________________________________________________  STUDIES REVIEWED:  I personally reviewed the images and interpretation is as follows:  X-rays knee reveals bilateral medial compartment mild to moderate osteoarthritic changes with some minor joint space narrowing and subchondral sclerosis.  No significant enthesophyte formation.  No calcification of the menisci.  No osseous lesions.  No acute fractures or dislocations    PROCEDURES PERFORMED:  Procedures    Hector Roque PA-C

## 2025-01-02 ENCOUNTER — CONSULT (OUTPATIENT)
Dept: SURGERY | Facility: CLINIC | Age: 45
End: 2025-01-02
Payer: COMMERCIAL

## 2025-01-02 ENCOUNTER — OFFICE VISIT (OUTPATIENT)
Age: 45
End: 2025-01-02
Payer: COMMERCIAL

## 2025-01-02 ENCOUNTER — TELEPHONE (OUTPATIENT)
Age: 45
End: 2025-01-02

## 2025-01-02 VITALS
WEIGHT: 194 LBS | TEMPERATURE: 98.1 F | OXYGEN SATURATION: 96 % | RESPIRATION RATE: 18 BRPM | DIASTOLIC BLOOD PRESSURE: 82 MMHG | BODY MASS INDEX: 27.77 KG/M2 | HEIGHT: 70 IN | SYSTOLIC BLOOD PRESSURE: 110 MMHG | HEART RATE: 102 BPM

## 2025-01-02 VITALS — BODY MASS INDEX: 27.77 KG/M2 | WEIGHT: 194 LBS | HEIGHT: 70 IN

## 2025-01-02 DIAGNOSIS — Z00.00 WELL ADULT EXAM: ICD-10-CM

## 2025-01-02 DIAGNOSIS — L08.9 INFECTED SEBACEOUS CYST: Primary | ICD-10-CM

## 2025-01-02 DIAGNOSIS — N60.01 BREAST CYST, RIGHT: Primary | ICD-10-CM

## 2025-01-02 DIAGNOSIS — L72.3 INFECTED SEBACEOUS CYST: Primary | ICD-10-CM

## 2025-01-02 PROCEDURE — 99203 OFFICE O/P NEW LOW 30 MIN: CPT | Performed by: SURGERY

## 2025-01-02 PROCEDURE — 99204 OFFICE O/P NEW MOD 45 MIN: CPT | Performed by: FAMILY MEDICINE

## 2025-01-02 PROCEDURE — 10060 I&D ABSCESS SIMPLE/SINGLE: CPT | Performed by: SURGERY

## 2025-01-02 RX ORDER — AMOXICILLIN AND CLAVULANATE POTASSIUM 500; 125 MG/1; MG/1
1 TABLET, FILM COATED ORAL 2 TIMES DAILY
COMMUNITY

## 2025-01-02 RX ORDER — SULFAMETHOXAZOLE AND TRIMETHOPRIM 800; 160 MG/1; MG/1
1 TABLET ORAL 2 TIMES DAILY
COMMUNITY

## 2025-01-02 NOTE — TELEPHONE ENCOUNTER
Patient's doctor office called for ASAP appointment. Checked with Dr. Horton's office who agreed to see her today; provided address.

## 2025-01-02 NOTE — PROGRESS NOTES
"Name: Lindsay Holder      : 1980      MRN: [id number]  Encounter Provider: Evaristo Horton DO  Encounter Date: 2025   Encounter department: [facility] GENERAL SURGERY [facility]  :  Assessment & Plan  Infected sebaceous cyst    Orders:    Ambulatory Referral to General Surgery  Status post incision and drainage of infected sebaceous cyst of the right medial breast in the office today.  Amount of purulence and small portion of the cyst wall emanated.  Packing placed.  Wound care instructions given.  Asked her to complete her Bactrim.  No need for her Augmentin.  Instructions to remove packing in 48 hours in the shower.  Cover daily.  Will see back in 3 weeks for reevaluation.      History of Present Illness   HPI  Lindsay Holder is a 44 y.o. female who presents for evaluation of a right breast cyst.  States she has had the cyst for years.  The cyst became red and sore 1 1/2 weeks ago.  She was seen at [facility] and has been taking Augmentin and Bactrim for three days with no improvement.  Right medial breast cyst became inflamed last week.  She [facility] but no incision and drainage was performed.  She has had minimal improvement with the antibiotics.  Saw her family physician today who referred her to our office.  She has never had any type of mammogram before.  Was told this was a sebaceous cyst in the past  History obtained from: patient    Review of Systems   All other systems reviewed and are negative.    Medical History Reviewed by provider this encounter:     .     Objective   Ht 5' 10\" (1.778 m)   Wt 88 kg (194 lb)   BMI 27.84 kg/m²      Physical Exam  Skin:     General: Skin is warm and dry.      Comments: Infected sebaceous cyst of the right medial breast.  Fluctuance measures about 2 to 3 cm.  Area of induration proximately 4 to 5 cm.  Discussed risks and benefits of an incision and drainage procedure and she agrees to proceed. " "    Incision and Drainage    Date/Time: 1/2/2025 3:30 PM    Performed by: Evaristo Horton DO  Authorized by: Evaristo Horton DO  Universal Protocol:  Consent: Verbal consent obtained.  Risks and benefits: risks, benefits and alternatives were discussed  Consent given by: patient  Time out: Immediately prior to procedure a \"time out\" was called to verify the correct patient, procedure, equipment, support staff and site/side marked as required.  Patient understanding: patient states understanding of the procedure being performed  Patient identity confirmed: verbally with patient    Patient location:  Clinic  Location:     Type:  Abscess    Location: Right medial breast.  Anesthesia (see MAR for exact dosages):     Anesthesia method:  Local infiltration    Local anesthetic:  Lidocaine 1% WITH epi  Procedure details:     Complexity:  Intermediate    Incision types:  Cruciate    Scalpel blade:  11    Approach:  Open    Incision depth:  Subcutaneous    Wound management:  Probed and deloculated and extensive cleaning    Drainage:  Purulent    Drainage amount:  Copious    Wound treatment:  Wound left open    Packing materials:  1/2 in gauze  Post-procedure details:     Patient tolerance of procedure:  Tolerated well, no immediate complications          "

## 2025-01-02 NOTE — ASSESSMENT & PLAN NOTE
Orders:    Ambulatory Referral to General Surgery  Status post incision and drainage of infected sebaceous cyst of the right medial breast in the office today.  Amount of purulence and small portion of the cyst wall emanated.  Packing placed.  Wound care instructions given.  Asked her to complete her Bactrim.  No need for her Augmentin.  Instructions to remove packing in 48 hours in the shower.  Cover daily.  Will see back in 3 weeks for reevaluation.

## 2025-01-02 NOTE — LETTER
2025     Christ Maravilla DO  [address]  [address]  [facility] [address]    Patient: Lindsay Holder   YOB: 1980   Date of Visit: 2025       Dear Dr. Maravilla:    Thank you for referring Lindsay Holder to me for evaluation. Below are my notes for this consultation.    If you have questions, please do not hesitate to call me. I look forward to following your patient along with you.         Sincerely,        Evaristo Horton DO        CC: No Recipients    Evaristo Horton DO  2025  3:29 PM  Sign when Signing Visit  Name: Lindsay Holder      : 1980      MRN: [id number]  Encounter Provider: Evaristo Horton DO  Encounter Date: 2025   Encounter department: [facility] GENERAL SURGERY [facility]  :  Assessment & Plan  Infected sebaceous cyst    Orders:  •  Ambulatory Referral to General Surgery  Status post incision and drainage of infected sebaceous cyst of the right medial breast in the office today.  Amount of purulence and small portion of the cyst wall emanated.  Packing placed.  Wound care instructions given.  Asked her to complete her Bactrim.  No need for her Augmentin.  Instructions to remove packing in 48 hours in the shower.  Cover daily.  Will see back in 3 weeks for reevaluation.      History of Present Illness  HPI  Lindsay Holder is a 44 y.o. female who presents for evaluation of a right breast cyst.  States she has had the cyst for years.  The cyst became red and sore 1 1/2 weeks ago.  She was seen at [facility] and has been taking Augmentin and Bactrim for three days with no improvement.  Right medial breast cyst became inflamed last week.  [facility] but no incision and drainage was performed.  She has had minimal improvement with the antibiotics.  Saw her family physician today who referred her to our office.  She has never had any type of mammogram before.  Was told this was a sebaceous  "cyst in the past  History obtained from: patient    Review of Systems   All other systems reviewed and are negative.    Medical History Reviewed by provider this encounter:     .     Objective  Ht 5' 10\" (1.778 m)   Wt 88 kg (194 lb)   BMI 27.84 kg/m²      Physical Exam  Skin:     General: Skin is warm and dry.      Comments: Infected sebaceous cyst of the right medial breast.  Fluctuance measures about 2 to 3 cm.  Area of induration proximately 4 to 5 cm.  Discussed risks and benefits of an incision and drainage procedure and she agrees to proceed.     Incision and Drainage    Date/Time: 1/2/2025 3:30 PM    Performed by: Evaristo Horton DO  Authorized by: Evaristo Horton DO  Universal Protocol:  Consent: Verbal consent obtained.  Risks and benefits: risks, benefits and alternatives were discussed  Consent given by: patient  Time out: Immediately prior to procedure a \"time out\" was called to verify the correct patient, procedure, equipment, support staff and site/side marked as required.  Patient understanding: patient states understanding of the procedure being performed  Patient identity confirmed: verbally with patient    Patient location:  Clinic  Location:     Type:  Abscess    Location: Right medial breast.  Anesthesia (see MAR for exact dosages):     Anesthesia method:  Local infiltration    Local anesthetic:  Lidocaine 1% WITH epi  Procedure details:     Complexity:  Intermediate    Incision types:  Cruciate    Scalpel blade:  11    Approach:  Open    Incision depth:  Subcutaneous    Wound management:  Probed and deloculated and extensive cleaning    Drainage:  Purulent    Drainage amount:  Copious    Wound treatment:  Wound left open    Packing materials:  1/2 in gauze  Post-procedure details:     Patient tolerance of procedure:  Tolerated well, no immediate complications          "

## 2025-01-02 NOTE — PROGRESS NOTES
"Name: Lindsay Holder      : 1980      MRN: [id number]  Encounter Provider: Christ Maravilla DO  Encounter Date: 2025   Encounter department: [facility] PRIMARY CARE  :  Assessment & Plan  Breast cyst, right    Orders:  •  Ambulatory Referral to General Surgery; Future  Try to get her seen by surgery for definitive care as soon as possible.  She will follow-up for regular annual visit at her convenience.  Well adult exam    Orders:  •  Hemoglobin A1C  •  Comprehensive metabolic panel  •  CBC and differential  •  Lipid panel  •  TSH, 3rd generation with Free T4 reflex           History of Present Illness     Lindsay is a new patient to this office.  [facility] in her right breast.  She is diagnosed with a cyst and put on 2 antibiotics.  Augmentin and Bactrim.  She feels the mass on her breast is getting worse.    Patient presents with:  Breast Mass: Patient was seen Monday at urgent care for a cyst on right breast. Pt states cyst is getting worst. Pt is on antibiotic since 2024. No other concerns.   LR          Review of Systems   Constitutional: Negative.    HENT: Negative.     Respiratory: Negative.     Cardiovascular: Negative.    Gastrointestinal: Negative.    Skin:         Right breast mass as noted in HPI.       Objective   /82 (BP Location: Left arm, Patient Position: Sitting, Cuff Size: Adult)   Pulse 102   Temp 98.1 °F (36.7 °C) (Temporal)   Resp 18   Ht 5' 10\" (1.778 m)   Wt 88 kg (194 lb)   SpO2 96%   BMI 27.84 kg/m²      Physical Exam  Vitals and nursing note reviewed.   Constitutional:       Appearance: She is well-developed.   HENT:      Head: Normocephalic and atraumatic.   Eyes:      Pupils: Pupils are equal, round, and reactive to light.   Cardiovascular:      Rate and Rhythm: Normal rate.   Pulmonary:      Effort: Pulmonary effort is normal.   Chest:   Breasts:     Right: Mass and tenderness " present.       Abdominal:      Palpations: Abdomen is soft.   Musculoskeletal:         General: Normal range of motion.      Cervical back: Normal range of motion and neck supple.   Skin:     General: Skin is warm.   Neurological:      Mental Status: She is alert and oriented to person, place, and time.   Psychiatric:         Behavior: Behavior normal.

## 2025-01-07 ENCOUNTER — OFFICE VISIT (OUTPATIENT)
Dept: OBGYN CLINIC | Facility: CLINIC | Age: 45
End: 2025-01-07
Payer: COMMERCIAL

## 2025-01-07 VITALS — BODY MASS INDEX: 27.77 KG/M2 | HEIGHT: 70 IN | WEIGHT: 194 LBS

## 2025-01-07 DIAGNOSIS — M23.90 INTERNAL DERANGEMENT OF KNEE, UNSPECIFIED LATERALITY: Primary | ICD-10-CM

## 2025-01-07 PROCEDURE — 99214 OFFICE O/P EST MOD 30 MIN: CPT | Performed by: ORTHOPAEDIC SURGERY

## 2025-01-07 NOTE — PROGRESS NOTES
CHIEF COMPLAINT/REASON FOR VISIT  Chief Complaint   Patient presents with    Left Knee - Pain    Right Knee - Pain        HISTORY OF PRESENT ILLNESS  Lindsay Holder is a 44 y.o. female who presents for evaluation of her left and right knee. Patient said one month ago she started experiencing bilateral knee pain. She said she moved prior to her symptoms starting which she thinks started her symptoms. She describes the pain on the medial portions of her knee. She has been taking Mobic which hasn't helped. Patient reports sensation of clicking and buckling in her bilateral knees. She denies any trauma to the knees.    REVIEW OF SYSTEMS  Review of systems was performed and, outside that mentioned in the HPI, it was negative for symptomology related to the integumentary, hematologic, immunologic, allergic, neurologic, cardiovascular, respiratory, GI or  systems.    MEDICAL HISTORY  Patient Active Problem List   Diagnosis    Elderly primigravida in third trimester    37 weeks gestation of pregnancy    Status post primary low transverse  section    Infected sebaceous cyst       SURGICAL HISTORY  Past Surgical History:   Procedure Laterality Date    [place]  DELIVERY ONLY N/A 2018    Procedure:  SECTION ();  Surgeon: Malina Garza MD;  Location: [facility];  Service: Obstetrics    TONSILLECTOMY      19 y.o    TUBAL LIGATION N/A 2018    Procedure: LIGATION/COAGULATION TUBAL;  Surgeon: Malina Garza MD;  Location: [facility];  Service: Obstetrics    WISDOM TOOTH EXTRACTION      19 y.o       CURRENT MEDICATIONS    Current Outpatient Medications:     meloxicam (Mobic) 15 mg tablet, Take 1 tablet (15 mg total) by mouth daily (Patient taking differently: Take 15 mg by mouth if needed), Disp: 30 tablet, Rfl: 3    amoxicillin-clavulanate (Augmentin) 500-125 mg per tablet, Take 1 tablet by mouth 2 (two) times a day (Patient not taking: Reported on 2025), Disp: , Rfl:     methylPREDNISolone 4 MG  "tablet therapy pack, Use as directed on package (Patient not taking: Reported on 1/2/2025), Disp: 21 tablet, Rfl: 0    methylPREDNISolone 4 MG tablet therapy pack, Use as directed on package (Patient not taking: Reported on 1/2/2025), Disp: 21 tablet, Rfl: 0    Naproxen Sodium (ALEVE PO), Take by mouth (Patient not taking: Reported on 1/7/2025), Disp: , Rfl:     sulfamethoxazole-trimethoprim (BACTRIM DS) 800-160 mg per tablet, Take 1 tablet by mouth 2 (two) times a day (Patient not taking: Reported on 1/7/2025), Disp: , Rfl:     SOCIAL HISTORY  Social History     Socioeconomic History    Marital status: Single     Spouse name: Not on file    Number of children: Not on file    Years of education: Not on file    Highest education level: Not on file   Occupational History    Not on file   Tobacco Use    Smoking status: Never    Smokeless tobacco: Never   Vaping Use    Vaping status: Never Used   Substance and Sexual Activity    Alcohol use: No    Drug use: No    Sexual activity: Yes     Partners: Male     Birth control/protection: None   Other Topics Concern    Not on file   Social History Narrative    Not on file     Social Drivers of Health     Financial Resource Strain: Not on file   Food Insecurity: Not on file   Transportation Needs: Not on file   Physical Activity: Not on file   Stress: Not on file   Social Connections: Not on file   Intimate Partner Violence: Not on file   Housing Stability: Not on file       Objective     VITAL SIGNS  Ht 5' 10\" (1.778 m) Comment: verbal  Wt 88 kg (194 lb)   BMI 27.84 kg/m²      PHYSICAL EXAMINATION    Musculoskeletal: Left Knee Examination:  General: The patient is alert, oriented, and pleasant to interact with.  Patient ambulates with Normal gait pattern  Assistive Device: No  Alignment: normal  Skin is warm and dry to touch with no signs of erythema, ecchymosis, or infection   Effusion: minimal  ROM: 0° - 100°    MMT: deferred  TTP: distal quadriceps, medial joint " line  Flexor and extensor mechanisms are intact   Knee is stable to varus and valgus stress  Mishel's Test Positive   Lachman's Test - 1A  Calf compartments are soft and supple  2+ DP and PT pulses with brisk capillary refill to the toes  Sural, saphenous, tibial, superficial, and deep peroneal motor and sensory distributions intact  Sensation light touch intact distally    Musculoskeletal: Right Knee Examination:  General: The patient is alert, oriented, and pleasant to interact with.  Patient ambulates with Normal gait pattern  Assistive Device: No  Alignment: normal  Skin is warm and dry to touch with no signs of erythema, ecchymosis, or infection   Effusion: minimal  ROM: 0° - 100°    MMT: deferred  TTP: Medial joint line  Flexor and extensor mechanisms are intact   Knee is stable to varus and valgus stress  Mishel's Test Positive   Lachman's Test - 1A  Calf compartments are soft and supple  2+ DP and PT pulses with brisk capillary refill to the toes  Sural, saphenous, tibial, superficial, and deep peroneal motor and sensory distributions intact  Sensation light touch intact distally    RADIOGRAPHIC EXAMINATION/DIAGNOSTICS:  Independent interpretation of the right knee x-ray shows tricompartmental degenerative changes, loose bodies, and no other obvious osseous abnormalities. Independent interpretation of the left knee x-ray shows tricompartmental degenerative changes with no other obvious osseous abnormalities.    Procedure: XR knee 3 vw right non injury  Result Date: 12/3/2024  Narrative: XR KNEE 3 VW RIGHT NON INJURY INDICATION: M25.561: Pain in right knee M25.562: Pain in left knee. COMPARISON: None FINDINGS: No acute fracture or dislocation. No joint effusion. Mild tricompartmental osteoarthritis, evidenced by marginal osteophytes. Intra-articular calcific bodies. No lytic or blastic osseous lesion. Unremarkable soft tissues.     Impression: No acute osseous abnormality. Degenerative changes as  described. Computerized Assisted Algorithm (CAA) may have been used to analyze all applicable images. Workstation performed: [id number]     Procedure: XR knee 3 vw left non injury  Result Date: 12/3/2024  Narrative: XR KNEE 3 VW LEFT NON INJURY INDICATION: M25.561: Pain in right knee M25.562: Pain in left knee. COMPARISON: None FINDINGS: No acute fracture or dislocation. No joint effusion. No significant degenerative changes. No lytic or blastic osseous lesion. Unremarkable soft tissues.     Impression: No acute osseous abnormality. Computerized Assisted Algorithm (CAA) may have been used to analyze all applicable images. Workstation performed: [id number]       ASSESSMENT/PLAN:  Left knee pain; r/o internal derangement  Right knee pain; r/o internal derangement    History and clinical exam consistent with concern for soft tissue injury (ligamentous/meniscus). Plan to obtain MRI of the patient knee to further evaluate soft tissue injury. Patient will follow up to discuss results of the study and treatment plan. Recommend RICE and anti-inflammatories at this time.  They are to call with any other questions or concerns prior to next appointment.    Scribe Attestation      I,:  Tello Beasley PA-C am acting as a scribe while in the presence of the attending physician.:       I,:  Rodolfo Siegel MD personally performed the services described in this documentation    as scribed in my presence.:

## 2025-01-15 ENCOUNTER — HOSPITAL ENCOUNTER (OUTPATIENT)
Dept: RADIOLOGY | Facility: IMAGING CENTER | Age: 45
Discharge: HOME | End: 2025-01-15
Payer: COMMERCIAL

## 2025-01-15 DIAGNOSIS — M23.90 INTERNAL DERANGEMENT OF KNEE, UNSPECIFIED LATERALITY: ICD-10-CM

## 2025-01-15 PROCEDURE — 73721 MRI JNT OF LWR EXTRE W/O DYE: CPT

## 2025-01-21 ENCOUNTER — OFFICE VISIT (OUTPATIENT)
Dept: OBGYN CLINIC | Facility: CLINIC | Age: 45
End: 2025-01-21
Payer: COMMERCIAL

## 2025-01-21 VITALS — WEIGHT: 191 LBS | HEIGHT: 70 IN | BODY MASS INDEX: 27.35 KG/M2

## 2025-01-21 DIAGNOSIS — M17.0 BILATERAL PRIMARY OSTEOARTHRITIS OF KNEE: Primary | ICD-10-CM

## 2025-01-21 PROCEDURE — 99214 OFFICE O/P EST MOD 30 MIN: CPT | Performed by: ORTHOPAEDIC SURGERY

## 2025-01-21 PROCEDURE — 20610 DRAIN/INJ JOINT/BURSA W/O US: CPT | Performed by: ORTHOPAEDIC SURGERY

## 2025-01-21 RX ADMIN — TRIAMCINOLONE ACETONIDE 2 MG: 40 INJECTION, SUSPENSION INTRA-ARTICULAR; INTRAMUSCULAR at 15:45

## 2025-01-21 RX ADMIN — LIDOCAINE HYDROCHLORIDE 3 ML: 10 INJECTION, SOLUTION INFILTRATION; PERINEURAL at 15:45

## 2025-01-21 NOTE — PROGRESS NOTES
CHIEF COMPLAINT / REASON FOR VISIT  Lindsay Holder is a 44 y.o. who is following up today to discuss the results of her recent imaging study.    HISTORY OF PRESENT ILLNESS  Patient reports they are doing about the same from when we last saw them.     OBJECTIVE  Bilateral Knee    PHYSICAL EXAMINATION       Musculoskeletal: Left Knee Examination:  General: The patient is alert, oriented, and pleasant to interact with.  Patient ambulates with Normal gait pattern  Assistive Device: No  Alignment: normal  Skin is warm and dry to touch with no signs of erythema, ecchymosis, or infection   Effusion: minimal  ROM: 0° - 100°    MMT: deferred  TTP: distal quadriceps, medial joint line  Flexor and extensor mechanisms are intact   Knee is stable to varus and valgus stress  Mishel's Test Positive   Lachman's Test - 1A  Calf compartments are soft and supple  2+ DP and PT pulses with brisk capillary refill to the toes  Sural, saphenous, tibial, superficial, and deep peroneal motor and sensory distributions intact  Sensation light touch intact distally     Musculoskeletal: Right Knee Examination:  General: The patient is alert, oriented, and pleasant to interact with.  Patient ambulates with Normal gait pattern  Assistive Device: No  Alignment: normal  Skin is warm and dry to touch with no signs of erythema, ecchymosis, or infection   Effusion: minimal  ROM: 0° - 100°    MMT: deferred  TTP: Medial joint line  Flexor and extensor mechanisms are intact   Knee is stable to varus and valgus stress  Mishel's Test Positive   Lachman's Test - 1A  Calf compartments are soft and supple  2+ DP and PT pulses with brisk capillary refill to the toes  Sural, saphenous, tibial, superficial, and deep peroneal motor and sensory distributions intact  Sensation light touch intact distally    DIAGNOSTIC IMAGING:  Procedure: MRI knee left  wo contrast  Result Date: 1/17/2025  Narrative: MRI LEFT KNEE INDICATION:   M23.90: Unspecified internal  derangement of unspecified knee. COMPARISON: Correlation is made with the prior radiograph dated 12/2/2024. TECHNIQUE:    Multiplanar/multisequence MR of the left knee was performed. Imaging performed on 1.5T MRI FINDINGS: SUBCUTANEOUS TISSUES: Mild subcutaneous edema seen anterior to the patellar tendon and tibial tuberosity. JOINT EFFUSION: Small to moderate joint effusion. BAKER'S CYST: Moderate size partially ruptured Baker's cyst MENISCI: No discrete meniscus tear identified although there is slight medial extrusion of the medial meniscus noted. CRUCIATE LIGAMENTS: Intact. EXTENSOR APPARATUS: Intact. COLLATERAL LIGAMENTS: Grade 1 sprain medial collateral ligament. The lateral collateral ligamentous complex is intact. ARTICULAR SURFACES: There is moderate cartilage loss along the weightbearing portion of the medial femoral tibial compartment. There is moderate to severe patellofemoral compartment cartilage loss. This is greater laterally. BONES: Subchondral cystic changes along the patellofemoral compartment. MUSCULATURE:  Intact.     Impression: Mild to moderate tricompartmental osteoarthrosis most advanced in the patellofemoral compartment as above. Grade 1 sprain medial collateral ligament. Small to moderate joint effusion and moderate sized partially ruptured Baker's cyst. Workstation performed: [id number]     Procedure: MRI knee right  wo contrast  Result Date: 1/17/2025  Narrative: MRI RIGHT KNEE INDICATION:   M23.90: Unspecified internal derangement of unspecified knee. COMPARISON: Correlation is made with the prior radiograph dated 12/2/2024. TECHNIQUE: Multiplanar/multisequence MR of the right knee was performed. Imaging performed on 1.5T MRI FINDINGS: SUBCUTANEOUS TISSUES: Mild subcutaneous edema anterior to the patella tendon and tibial tuberosity. JOINT EFFUSION: There is a small joint effusion. BAKER'S CYST: There is a large Baker's cyst containing a few tiny chondral loose bodies. MENISCI: There  is a small radial tear at the junction of the anterior horn and body of the medial meniscus with mild medial extrusion. There is linear horizontal signal within the posterior horn of the medial meniscus without definite articular extension. The lateral meniscus is intact. CRUCIATE LIGAMENTS: Intact. EXTENSOR APPARATUS: Intact. COLLATERAL LIGAMENTS: There is edema surrounding the fibers of the medial collateral ligament which may be due to the adjacent meniscus tear or represent a grade 1 sprain. The lateral collateral ligamentous complex is intact. ARTICULAR SURFACES: There is moderate cartilage loss along the weightbearing portion of the medial femoral tibial compartment. There is a small near full-thickness chondral defect along the posterior weightbearing portion of the lateral femoral condyle measuring approximately 6 x 6 mm. There is moderate to severe patella cartilage loss. BONES: There is subchondral marrow edema within the medial tibial plateau likely stress response or contusion. Tiny subchondral cystic changes are seen along the patella. MUSCULATURE:  Intact.     Impression: Moderate tricompartmental osteoarthrosis as described above. Small radial tear at the junction of the anterior horn and body of the medial meniscus with mild medial extrusion. Edema surrounding the fibers of the medial collateral ligament may be due to the adjacent meniscus tear or represent a grade 1 sprain. Subchondral marrow edema medial tibial plateau likely stress response or contusion. Small joint effusion and large Baker's cyst containing a few tiny chondral loose bodies. Workstation performed: [id number]     Procedure: XR knee 3 vw right non injury  Result Date: 12/3/2024  Narrative: XR KNEE 3 VW RIGHT NON INJURY INDICATION: M25.561: Pain in right knee M25.562: Pain in left knee. COMPARISON: None FINDINGS: No acute fracture or dislocation. No joint effusion. Mild tricompartmental osteoarthritis, evidenced by marginal  osteophytes. Intra-articular calcific bodies. No lytic or blastic osseous lesion. Unremarkable soft tissues.     Impression: No acute osseous abnormality. Degenerative changes as described. Computerized Assisted Algorithm (CAA) may have been used to analyze all applicable images. Workstation performed: [id number]     Procedure: XR knee 3 vw left non injury  Result Date: 12/3/2024  Narrative: XR KNEE 3 VW LEFT NON INJURY INDICATION: M25.561: Pain in right knee M25.562: Pain in left knee. COMPARISON: None FINDINGS: No acute fracture or dislocation. No joint effusion. No significant degenerative changes. No lytic or blastic osseous lesion. Unremarkable soft tissues.     Impression: No acute osseous abnormality. Computerized Assisted Algorithm (CAA) may have been used to analyze all applicable images. Workstation performed: [id number]     Large joint arthrocentesis: L knee  Universal Protocol:  procedure performed by consultantConsent: Verbal consent obtained.  Risks and benefits: risks, benefits and alternatives were discussed  Consent given by: patient  Patient understanding: patient states understanding of the procedure being performed  Site marked: the operative site was marked  Patient identity confirmed: verbally with patient  Supporting Documentation  Indications: pain and joint swelling   Procedure Details  Location: knee - L knee  Preparation: Patient was prepped and draped in the usual sterile fashion  Needle size: 22 G  Ultrasound guidance: no  Approach: lateral  Medications administered: 3 mL lidocaine 1 %; 2 mg triamcinolone acetonide 40 mg/mL    Patient tolerance: patient tolerated the procedure well with no immediate complications  Dressing:  Sterile dressing applied      Large joint arthrocentesis: R knee  [place] Protocol:  procedure performed by consultantConsent: Verbal consent obtained.  Risks and benefits: risks, benefits and alternatives were discussed  Consent given by: patient  Patient  understanding: patient states understanding of the procedure being performed  Site marked: the operative site was marked  Patient identity confirmed: verbally with patient  Supporting Documentation  Indications: pain and joint swelling   Procedure Details  Location: knee - R knee  Preparation: Patient was prepped and draped in the usual sterile fashion  Needle size: 22 G  Ultrasound guidance: no  Approach: lateral  Medications administered: 3 mL lidocaine 1 %; 2 mg triamcinolone acetonide 40 mg/mL    Patient tolerance: patient tolerated the procedure well with no immediate complications  Dressing:  Sterile dressing applied         ASSESSMENT/PLAN:  Mild tricompartmental osteoarthritis of bilateral knees  Discussed surgical and non-surgical interventions of both knee arthroscopy and patellofemoral arthroplasty  Patient elected to go the non-surgical route. Bilateral knee CSI were offered and accepted by the patient. Patient tolerated the procedure well.  Can f/u in 3 months. Please call with any questions or concerns        Scribe Attestation      I,:   am acting as a scribe while in the presence of the attending physician.:       I,:   personally performed the services described in this documentation    as scribed in my presence.:

## 2025-01-27 RX ORDER — LIDOCAINE HYDROCHLORIDE 10 MG/ML
3 INJECTION, SOLUTION INFILTRATION; PERINEURAL
Status: COMPLETED | OUTPATIENT
Start: 2025-01-21 | End: 2025-01-21

## 2025-01-27 RX ORDER — TRIAMCINOLONE ACETONIDE 40 MG/ML
2 INJECTION, SUSPENSION INTRA-ARTICULAR; INTRAMUSCULAR
Status: COMPLETED | OUTPATIENT
Start: 2025-01-21 | End: 2025-01-21

## (undated) DEVICE — SUT CHROMIC 0 CT-1 27 IN 812H

## (undated) DEVICE — SUT VICRYL 0 CTX 36 IN J978H

## (undated) DEVICE — 3M™ STERI-STRIP™ REINFORCED ADHESIVE SKIN CLOSURES, R1547, 1/2 IN X 4 IN (12 MM X 100 MM), 6 STRIPS/ENVELOPE: Brand: 3M™ STERI-STRIP™

## (undated) DEVICE — SWABSTCK, BENZOIN TINCTURE, 1/PK, STRL: Brand: APLICARE

## (undated) DEVICE — SUT VICRYL 3-0 SH 27 IN J416H

## (undated) DEVICE — GLOVE INDICATOR PI UNDERGLOVE SZ 7.5 BLUE

## (undated) DEVICE — SURGIFOAM 8.5 X 12.5

## (undated) DEVICE — ABG MICROSTICKS SAFETY

## (undated) DEVICE — CHLORAPREP HI-LITE 26ML ORANGE

## (undated) DEVICE — PACK C-SECTION PBDS

## (undated) DEVICE — SUT VICRYL 0 CT-1 36 IN J946H

## (undated) DEVICE — GLOVE SRG BIOGEL ECLIPSE 7

## (undated) DEVICE — SUT MONOCRYL 4-0 PS-2 27 IN Y426H